# Patient Record
Sex: FEMALE | Race: WHITE | Employment: FULL TIME | URBAN - METROPOLITAN AREA
[De-identification: names, ages, dates, MRNs, and addresses within clinical notes are randomized per-mention and may not be internally consistent; named-entity substitution may affect disease eponyms.]

---

## 2017-01-13 DIAGNOSIS — F41.9 ANXIETY: ICD-10-CM

## 2017-01-13 RX ORDER — ALPRAZOLAM 1 MG/1
1 TABLET ORAL 3 TIMES DAILY PRN
Qty: 90 TABLET | Refills: 2 | OUTPATIENT
Start: 2017-01-13 | End: 2017-04-12 | Stop reason: SDUPTHER

## 2017-03-24 RX ORDER — BUPROPION HYDROCHLORIDE 300 MG/1
300 TABLET ORAL EVERY MORNING
Qty: 30 TABLET | Refills: 5 | Status: SHIPPED | OUTPATIENT
Start: 2017-03-24 | End: 2018-07-26 | Stop reason: ALTCHOICE

## 2017-04-12 DIAGNOSIS — F41.9 ANXIETY: ICD-10-CM

## 2017-04-12 RX ORDER — ALPRAZOLAM 1 MG/1
1 TABLET ORAL 3 TIMES DAILY PRN
Qty: 90 TABLET | Refills: 2 | OUTPATIENT
Start: 2017-04-12 | End: 2018-07-26 | Stop reason: ALTCHOICE

## 2017-04-12 RX ORDER — LORATADINE 10 MG/1
10 TABLET ORAL DAILY
Qty: 30 TABLET | Refills: 5 | Status: SHIPPED | OUTPATIENT
Start: 2017-04-12 | End: 2017-05-12

## 2017-04-12 RX ORDER — LORATADINE 10 MG/1
10 TABLET ORAL DAILY
Refills: 0 | COMMUNITY
Start: 2017-04-04 | End: 2017-04-12 | Stop reason: SDUPTHER

## 2017-04-20 ENCOUNTER — OFFICE VISIT (OUTPATIENT)
Dept: FAMILY MEDICINE CLINIC | Age: 32
End: 2017-04-20
Payer: MEDICARE

## 2017-04-20 VITALS
SYSTOLIC BLOOD PRESSURE: 115 MMHG | DIASTOLIC BLOOD PRESSURE: 80 MMHG | TEMPERATURE: 98.1 F | HEART RATE: 92 BPM | BODY MASS INDEX: 23.39 KG/M2 | WEIGHT: 132 LBS | OXYGEN SATURATION: 98 % | HEIGHT: 63 IN

## 2017-04-20 DIAGNOSIS — F43.10 PTSD (POST-TRAUMATIC STRESS DISORDER): ICD-10-CM

## 2017-04-20 DIAGNOSIS — G89.29 CHRONIC NONINTRACTABLE HEADACHE, UNSPECIFIED HEADACHE TYPE: Primary | ICD-10-CM

## 2017-04-20 DIAGNOSIS — Z79.899 ENCOUNTER FOR LONG TERM BENZODIAZEPINE THERAPY: ICD-10-CM

## 2017-04-20 DIAGNOSIS — R51.9 CHRONIC NONINTRACTABLE HEADACHE, UNSPECIFIED HEADACHE TYPE: Primary | ICD-10-CM

## 2017-04-20 LAB
AMPHETAMINE SCREEN, URINE: NEGATIVE
BARBITURATE SCREEN, URINE: NEGATIVE
BENZODIAZEPINE SCREEN, URINE: POSITIVE
COCAINE METABOLITE SCREEN URINE: NEGATIVE
MDMA URINE: NEGATIVE
METHADONE SCREEN, URINE: NEGATIVE
METHAMPHETAMINE, URINE: NEGATIVE
OPIATE SCREEN URINE: NEGATIVE
OXYCODONE SCREEN URINE: NEGATIVE
PHENCYCLIDINE SCREEN URINE: NEGATIVE
PROPOXYPHENE SCREEN, URINE: NORMAL
THC: NEGATIVE
TRICYCLIC ANTIDEPRESSANTS, UR: NEGATIVE

## 2017-04-20 PROCEDURE — 99214 OFFICE O/P EST MOD 30 MIN: CPT | Performed by: NURSE PRACTITIONER

## 2017-04-20 PROCEDURE — 80305 DRUG TEST PRSMV DIR OPT OBS: CPT | Performed by: NURSE PRACTITIONER

## 2017-04-20 RX ORDER — TOPIRAMATE 25 MG/1
25 TABLET ORAL NIGHTLY
Qty: 60 TABLET | Refills: 3 | Status: SHIPPED | OUTPATIENT
Start: 2017-04-20 | End: 2018-07-26 | Stop reason: ALTCHOICE

## 2017-04-20 ASSESSMENT — ENCOUNTER SYMPTOMS
VOMITING: 0
COUGH: 0
NAUSEA: 0
SHORTNESS OF BREATH: 0
CHEST TIGHTNESS: 0
ABDOMINAL PAIN: 0

## 2017-04-26 LAB
AMPHETAMINE SCREEN, URINE: NORMAL
BARBITURATE SCREEN, URINE: NORMAL
BENZODIAZEPINE SCREEN, URINE: NORMAL
CANNABINOID SCREEN URINE: NORMAL
COCAINE METABOLITE, URINE: NORMAL
MDMA URINE: NORMAL
METHADONE SCREEN, URINE: NORMAL
METHAMPHETAMINE, URINE: NORMAL
OPIATES, URINE: NORMAL
OXYCODONE: NORMAL
PCP: NORMAL
PHENCYCLIDINE, URINE: NORMAL
PROPOXYPHENE, URINE: NORMAL
TRICYCLIC ANTIDEPRESSANTS, UR: NORMAL

## 2017-04-27 DIAGNOSIS — Z79.899 ENCOUNTER FOR LONG TERM BENZODIAZEPINE THERAPY: ICD-10-CM

## 2017-04-28 ENCOUNTER — TELEPHONE (OUTPATIENT)
Dept: FAMILY MEDICINE CLINIC | Age: 32
End: 2017-04-28

## 2017-05-17 ENCOUNTER — HOSPITAL ENCOUNTER (EMERGENCY)
Age: 32
Discharge: HOME | End: 2017-05-17
Attending: EMERGENCY MEDICINE
Payer: MEDICARE

## 2017-05-17 VITALS
SYSTOLIC BLOOD PRESSURE: 118 MMHG | DIASTOLIC BLOOD PRESSURE: 82 MMHG | BODY MASS INDEX: 21.16 KG/M2 | HEART RATE: 81 BPM | WEIGHT: 115 LBS | HEIGHT: 62 IN | RESPIRATION RATE: 18 BRPM | OXYGEN SATURATION: 100 % | TEMPERATURE: 97.7 F

## 2017-05-17 DIAGNOSIS — S39.012A LUMBAR STRAIN, INITIAL ENCOUNTER: Primary | ICD-10-CM

## 2017-05-17 PROCEDURE — 6360000002 HC RX W HCPCS: Performed by: PHYSICIAN ASSISTANT

## 2017-05-17 PROCEDURE — 99283 EMERGENCY DEPT VISIT LOW MDM: CPT

## 2017-05-17 PROCEDURE — 96372 THER/PROPH/DIAG INJ SC/IM: CPT

## 2017-05-17 RX ORDER — CYCLOBENZAPRINE HCL 10 MG
5 TABLET ORAL 2 TIMES DAILY PRN
Qty: 10 TABLET | Refills: 0 | Status: SHIPPED | OUTPATIENT
Start: 2017-05-17 | End: 2017-05-27

## 2017-05-17 RX ORDER — LORATADINE 10 MG/1
10 CAPSULE, LIQUID FILLED ORAL DAILY
COMMUNITY
End: 2018-07-26 | Stop reason: ALTCHOICE

## 2017-05-17 RX ORDER — PREDNISONE 20 MG/1
20 TABLET ORAL DAILY
Qty: 5 TABLET | Refills: 0 | Status: SHIPPED | OUTPATIENT
Start: 2017-05-17 | End: 2017-05-22

## 2017-05-17 RX ORDER — KETOROLAC TROMETHAMINE 30 MG/ML
60 INJECTION, SOLUTION INTRAMUSCULAR; INTRAVENOUS ONCE
Status: COMPLETED | OUTPATIENT
Start: 2017-05-17 | End: 2017-05-17

## 2017-05-17 RX ADMIN — KETOROLAC TROMETHAMINE 60 MG: 30 INJECTION, SOLUTION INTRAMUSCULAR at 10:07

## 2017-05-17 ASSESSMENT — PAIN DESCRIPTION - ORIENTATION: ORIENTATION: LEFT;LOWER

## 2017-05-17 ASSESSMENT — ENCOUNTER SYMPTOMS
BOWEL INCONTINENCE: 0
BACK PAIN: 1
STRIDOR: 0
ABDOMINAL PAIN: 0
COUGH: 0
NAUSEA: 0
WHEEZING: 0
ABDOMINAL SWELLING: 0

## 2017-05-17 ASSESSMENT — PAIN DESCRIPTION - DESCRIPTORS: DESCRIPTORS: ACHING

## 2017-05-17 ASSESSMENT — PAIN SCALES - GENERAL
PAINLEVEL_OUTOF10: 8
PAINLEVEL_OUTOF10: 8

## 2017-05-17 ASSESSMENT — PAIN DESCRIPTION - LOCATION: LOCATION: BACK

## 2017-05-17 ASSESSMENT — PAIN DESCRIPTION - PAIN TYPE: TYPE: ACUTE PAIN

## 2017-05-17 ASSESSMENT — PAIN DESCRIPTION - FREQUENCY: FREQUENCY: CONTINUOUS

## 2017-12-26 DIAGNOSIS — A60.9 HSV (HERPES SIMPLEX VIRUS) ANOGENITAL INFECTION: Primary | ICD-10-CM

## 2017-12-26 RX ORDER — VALACYCLOVIR HYDROCHLORIDE 500 MG/1
500 TABLET, FILM COATED ORAL DAILY
Qty: 30 TABLET | Refills: 10 | Status: SHIPPED | OUTPATIENT
Start: 2017-12-26 | End: 2018-07-26 | Stop reason: SDUPTHER

## 2017-12-26 RX ORDER — VALACYCLOVIR HYDROCHLORIDE 500 MG/1
500 TABLET, FILM COATED ORAL 2 TIMES DAILY
Qty: 20 TABLET | Refills: 0 | Status: SHIPPED | OUTPATIENT
Start: 2017-12-26 | End: 2018-01-05

## 2017-12-26 NOTE — TELEPHONE ENCOUNTER
Patient had stopped her maintenance dose of Valtrex some months ago so had a breakout over [place]. Needs script for breakout and then new script for maintenance dose, which she will start using again. Scripts pended above.

## 2018-07-26 ENCOUNTER — OFFICE VISIT (OUTPATIENT)
Dept: FAMILY MEDICINE CLINIC | Age: 33
End: 2018-07-26
Payer: MEDICARE

## 2018-07-26 VITALS
WEIGHT: 128 LBS | OXYGEN SATURATION: 99 % | HEART RATE: 75 BPM | DIASTOLIC BLOOD PRESSURE: 70 MMHG | BODY MASS INDEX: 22.68 KG/M2 | TEMPERATURE: 97.7 F | RESPIRATION RATE: 16 BRPM | HEIGHT: 63 IN | SYSTOLIC BLOOD PRESSURE: 107 MMHG

## 2018-07-26 DIAGNOSIS — Z13.31 POSITIVE DEPRESSION SCREENING: ICD-10-CM

## 2018-07-26 DIAGNOSIS — J01.90 ACUTE SINUSITIS, RECURRENCE NOT SPECIFIED, UNSPECIFIED LOCATION: Primary | ICD-10-CM

## 2018-07-26 DIAGNOSIS — F32.A DEPRESSIVE DISORDER: ICD-10-CM

## 2018-07-26 DIAGNOSIS — F41.9 ANXIETY: ICD-10-CM

## 2018-07-26 PROCEDURE — 99213 OFFICE O/P EST LOW 20 MIN: CPT | Performed by: FAMILY MEDICINE

## 2018-07-26 PROCEDURE — G8427 DOCREV CUR MEDS BY ELIG CLIN: HCPCS | Performed by: FAMILY MEDICINE

## 2018-07-26 PROCEDURE — 96160 PT-FOCUSED HLTH RISK ASSMT: CPT | Performed by: FAMILY MEDICINE

## 2018-07-26 PROCEDURE — G8431 POS CLIN DEPRES SCRN F/U DOC: HCPCS | Performed by: FAMILY MEDICINE

## 2018-07-26 PROCEDURE — 1036F TOBACCO NON-USER: CPT | Performed by: FAMILY MEDICINE

## 2018-07-26 PROCEDURE — G8420 CALC BMI NORM PARAMETERS: HCPCS | Performed by: FAMILY MEDICINE

## 2018-07-26 RX ORDER — ONDANSETRON 4 MG/1
TABLET, ORALLY DISINTEGRATING ORAL
Refills: 0 | COMMUNITY
Start: 2018-07-17 | End: 2020-10-05

## 2018-07-26 RX ORDER — BUPROPION HYDROCHLORIDE 150 MG/1
150 TABLET ORAL EVERY MORNING
Qty: 10 TABLET | Refills: 0 | Status: SHIPPED | OUTPATIENT
Start: 2018-07-26 | End: 2020-10-05

## 2018-07-26 RX ORDER — BUPROPION HYDROCHLORIDE 150 MG/1
150 TABLET ORAL EVERY MORNING
Qty: 10 TABLET | Refills: 0 | Status: SHIPPED | OUTPATIENT
Start: 2018-07-26 | End: 2018-07-26 | Stop reason: ALTCHOICE

## 2018-07-26 RX ORDER — AMOXICILLIN 500 MG/1
500 TABLET, FILM COATED ORAL 3 TIMES DAILY
Qty: 30 TABLET | Refills: 0 | Status: SHIPPED | OUTPATIENT
Start: 2018-07-26 | End: 2018-08-05

## 2018-07-26 RX ORDER — VALACYCLOVIR HYDROCHLORIDE 500 MG/1
TABLET, FILM COATED ORAL
COMMUNITY
Start: 2016-04-19 | End: 2020-10-05

## 2018-07-26 ASSESSMENT — PATIENT HEALTH QUESTIONNAIRE - PHQ9
4. FEELING TIRED OR HAVING LITTLE ENERGY: 1
9. THOUGHTS THAT YOU WOULD BE BETTER OFF DEAD, OR OF HURTING YOURSELF: 1
3. TROUBLE FALLING OR STAYING ASLEEP: 3
10. IF YOU CHECKED OFF ANY PROBLEMS, HOW DIFFICULT HAVE THESE PROBLEMS MADE IT FOR YOU TO DO YOUR WORK, TAKE CARE OF THINGS AT HOME, OR GET ALONG WITH OTHER PEOPLE: 3
2. FEELING DOWN, DEPRESSED OR HOPELESS: 2
8. MOVING OR SPEAKING SO SLOWLY THAT OTHER PEOPLE COULD HAVE NOTICED. OR THE OPPOSITE, BEING SO FIGETY OR RESTLESS THAT YOU HAVE BEEN MOVING AROUND A LOT MORE THAN USUAL: 3
1. LITTLE INTEREST OR PLEASURE IN DOING THINGS: 3
7. TROUBLE CONCENTRATING ON THINGS, SUCH AS READING THE NEWSPAPER OR WATCHING TELEVISION: 3
5. POOR APPETITE OR OVEREATING: 3
6. FEELING BAD ABOUT YOURSELF - OR THAT YOU ARE A FAILURE OR HAVE LET YOURSELF OR YOUR FAMILY DOWN: 3
SUM OF ALL RESPONSES TO PHQ9 QUESTIONS 1 & 2: 5
SUM OF ALL RESPONSES TO PHQ QUESTIONS 1-9: 22

## 2018-07-26 ASSESSMENT — ENCOUNTER SYMPTOMS
HOARSE VOICE: 1
RHINORRHEA: 1
SHORTNESS OF BREATH: 0
NAUSEA: 1
SINUS PAIN: 1
DIARRHEA: 0
SINUS PRESSURE: 1
COUGH: 1
SORE THROAT: 1
VOMITING: 0
ABDOMINAL PAIN: 0
EYES NEGATIVE: 1
WHEEZING: 0
BACK PAIN: 0
SWOLLEN GLANDS: 1
CHEST TIGHTNESS: 0

## 2018-07-26 NOTE — PROGRESS NOTES
Associated symptoms include chills, congestion, coughing, diaphoresis, headaches, a hoarse voice, neck pain, sinus pressure, sneezing, a sore throat and swollen glands. Pertinent negatives include no ear pain or shortness of breath. Treatments tried: corricidin, benadryl, nasal spray, claritin. The treatment provided no relief.        Past Medical History:   Diagnosis Date    Anxiety     Asthma     HSV (herpes simplex virus) anogenital infection 12/26/2017    Mitral valve prolapse 2005    with regurg, has echo q 6 months, premed with dental visits    POTS (postural orthostatic tachycardia syndrome) 2012    Pulmonary hypertension       Past Surgical History:   Procedure Laterality Date    BREAST BIOPSY      BREAST ENHANCEMENT SURGERY Bilateral 2008    removed in 2011   [facility] Bilateral 2011    COLPOSCOPY  06/20/2016    Dr. Nadia Mejia Bilateral 2011    TUBAL LIGATION      2015 at flower     WISDOM TOOTH EXTRACTION         Family History   Problem Relation Age of Onset    Sudden Death Paternal Grandfather         suicide    Diabetes Maternal Grandmother     Heart Attack Maternal Grandmother     Hypertension Maternal Grandfather     Colon Cancer Maternal Grandfather     Diabetes Mother     Hypertension Mother     Osteoporosis Paternal Grandmother        Social History   Substance Use Topics    Smoking status: Never Smoker    Smokeless tobacco: Never Used    Alcohol use No      Current Outpatient Prescriptions   Medication Sig Dispense Refill    ondansetron (ZOFRAN-ODT) 4 MG disintegrating tablet dissolve 1 tablet ON TONGUE every 8 hours if needed for nausea  0    valACYclovir (VALTREX) 500 MG tablet Take by mouth      Amoxicillin 500 MG TABS Take 500 mg by mouth 3 times daily for 10 days 30 tablet 0    buPROPion (WELLBUTRIN XL) 150 MG extended release tablet Take 1 tablet by mouth every morning for 10 days 10 tablet 0   

## 2018-07-26 NOTE — PATIENT INSTRUCTIONS
techniques. · Engage your mind. Get out and do something you enjoy. Go to a funny movie, or take a walk or hike. Plan your day. Having too much or too little to do can make you anxious. · Keep a record of your symptoms. Discuss your fears with a good friend or family member, or join a support group for people with similar problems. Talking to others sometimes relieves stress. · Get involved in social groups, or volunteer to help others. Being alone sometimes makes things seem worse than they are. · Get at least 30 minutes of exercise on most days of the week to relieve stress. Walking is a good choice. You also may want to do other activities, such as running, swimming, cycling, or playing tennis or team sports. Relaxation techniques  Do relaxation exercises 10 to 20 minutes a day. You can play soothing, relaxing music while you do them, if you wish. · Tell others in your house that you are going to do your relaxation exercises. Ask them not to disturb you. · Find a comfortable place, away from all distractions and noise. · Lie down on your back, or sit with your back straight. · Focus on your breathing. Make it slow and steady. · Breathe in through your nose. Breathe out through either your nose or mouth. · Breathe deeply, filling up the area between your navel and your rib cage. Breathe so that your belly goes up and down. · Do not hold your breath. · Breathe like this for 5 to 10 minutes. Notice the feeling of calmness throughout your whole body. As you continue to breathe slowly and deeply, relax by doing the following for another 5 to 10 minutes:  · Tighten and relax each muscle group in your body. You can begin at your toes and work your way up to your head. · Imagine your muscle groups relaxing and becoming heavy. · Empty your mind of all thoughts. · Let yourself relax more and more deeply. · Become aware of the state of calmness that surrounds you.   · When your relaxation time is over, you happen. This feeling interferes with your life. These disorders include:  · Generalized anxiety disorder. You feel worried and stressed about many everyday events and activities. This goes on for several months and disrupts your life on most days. · Panic disorder. You have repeated panic attacks. A panic attack is a sudden, intense fear or anxiety. It may make you feel short of breath. Your heart may pound. · Social anxiety disorder. You feel very anxious about what you will say or do in front of people. For example, you may be scared to talk or eat in public. This problem affects your daily life. · Phobias. You are very scared of a specific object, situation, or activity. For example, you may fear spiders, high places, or small spaces. What are the symptoms? Generalized anxiety disorder  Symptoms may include:  · Feeling worried and stressed about many things almost every day. · Feeling tired or irritable. You may have a hard time concentrating. · Having headaches or muscle aches. · Having a hard time getting to sleep or staying asleep. Panic disorder  You may have repeated panic attacks when there is no reason for feeling afraid. You may change your daily activities because you worry that you will have another attack. Symptoms may include:  · Intense fear, terror, or anxiety. · Trouble breathing or very fast breathing. · Chest pain or tightness. · A heartbeat that races or is not regular. Social anxiety disorder  Symptoms may include:  · Fear about a social situation, such as eating in front of others or speaking in public. You may worry a lot. Or you may be afraid that something bad will happen. · Anxiety that can cause you to blush, sweat, and feel shaky. · A heartbeat that is faster than normal.  · A hard time focusing. Phobias  Symptoms may include:  · More fear than most people of being around an object, being in a situation, or doing an activity.  You might also be stressed about the chance

## 2019-10-14 ENCOUNTER — HOSPITAL ENCOUNTER (EMERGENCY)
Age: 34
Discharge: HOME | End: 2019-10-14
Attending: EMERGENCY MEDICINE
Payer: MEDICARE

## 2019-10-14 VITALS
RESPIRATION RATE: 16 BRPM | OXYGEN SATURATION: 100 % | TEMPERATURE: 98.1 F | DIASTOLIC BLOOD PRESSURE: 88 MMHG | SYSTOLIC BLOOD PRESSURE: 141 MMHG | BODY MASS INDEX: 23.4 KG/M2 | WEIGHT: 130 LBS | HEART RATE: 102 BPM

## 2019-10-14 DIAGNOSIS — F41.1 ANXIETY STATE: Primary | ICD-10-CM

## 2019-10-14 PROCEDURE — 6370000000 HC RX 637 (ALT 250 FOR IP): Performed by: NURSE PRACTITIONER

## 2019-10-14 PROCEDURE — 99283 EMERGENCY DEPT VISIT LOW MDM: CPT

## 2019-10-14 RX ORDER — LORAZEPAM 1 MG/1
0.5 TABLET ORAL ONCE
Status: COMPLETED | OUTPATIENT
Start: 2019-10-14 | End: 2019-10-14

## 2019-10-14 RX ORDER — FLUOXETINE HYDROCHLORIDE 20 MG/1
20 CAPSULE ORAL DAILY
COMMUNITY
End: 2022-02-01 | Stop reason: SDUPTHER

## 2019-10-14 RX ORDER — LORAZEPAM 0.5 MG/1
0.5 TABLET ORAL EVERY 8 HOURS PRN
Qty: 6 TABLET | Refills: 0 | Status: SHIPPED | OUTPATIENT
Start: 2019-10-14 | End: 2019-10-16

## 2019-10-14 RX ORDER — OMEPRAZOLE 20 MG/1
40 CAPSULE, DELAYED RELEASE ORAL DAILY
COMMUNITY
End: 2020-10-05

## 2019-10-14 RX ADMIN — LORAZEPAM 0.5 MG: 1 TABLET ORAL at 13:04

## 2019-10-14 ASSESSMENT — ENCOUNTER SYMPTOMS
COUGH: 0
VOMITING: 0
TROUBLE SWALLOWING: 0
SHORTNESS OF BREATH: 0
NAUSEA: 0

## 2020-10-05 ENCOUNTER — OFFICE VISIT (OUTPATIENT)
Dept: OBGYN CLINIC | Age: 35
End: 2020-10-05
Payer: MEDICARE

## 2020-10-05 ENCOUNTER — HOSPITAL ENCOUNTER (OUTPATIENT)
Age: 35
Setting detail: SPECIMEN
Discharge: HOME | End: 2020-10-05
Payer: MEDICARE

## 2020-10-05 VITALS
SYSTOLIC BLOOD PRESSURE: 118 MMHG | RESPIRATION RATE: 16 BRPM | BODY MASS INDEX: 28.46 KG/M2 | WEIGHT: 158.13 LBS | TEMPERATURE: 98.2 F | DIASTOLIC BLOOD PRESSURE: 72 MMHG

## 2020-10-05 PROBLEM — I36.1 NONRHEUMATIC TRICUSPID VALVE REGURGITATION: Status: ACTIVE | Noted: 2018-08-15

## 2020-10-05 PROBLEM — I34.0 NONRHEUMATIC MITRAL (VALVE) INSUFFICIENCY: Status: ACTIVE | Noted: 2018-08-15

## 2020-10-05 PROBLEM — I27.20 PULMONARY HYPERTENSION (HCC): Status: ACTIVE | Noted: 2017-07-14

## 2020-10-05 PROBLEM — R00.2 PALPITATIONS: Status: ACTIVE | Noted: 2018-08-15

## 2020-10-05 PROBLEM — F33.1 MODERATE EPISODE OF RECURRENT MAJOR DEPRESSIVE DISORDER (HCC): Status: ACTIVE | Noted: 2017-07-14

## 2020-10-05 PROBLEM — A60.04 HERPES SIMPLEX VULVOVAGINITIS: Status: ACTIVE | Noted: 2017-07-14

## 2020-10-05 PROCEDURE — G8484 FLU IMMUNIZE NO ADMIN: HCPCS | Performed by: NURSE PRACTITIONER

## 2020-10-05 PROCEDURE — 99385 PREV VISIT NEW AGE 18-39: CPT | Performed by: NURSE PRACTITIONER

## 2020-10-05 RX ORDER — VALACYCLOVIR HYDROCHLORIDE 500 MG/1
500 TABLET, FILM COATED ORAL DAILY
Qty: 30 TABLET | Refills: 0 | Status: SHIPPED | OUTPATIENT
Start: 2020-10-05 | End: 2021-01-26 | Stop reason: SDUPTHER

## 2020-10-05 ASSESSMENT — ENCOUNTER SYMPTOMS
VOMITING: 0
ABDOMINAL PAIN: 0
COLOR CHANGE: 0
CONSTIPATION: 0
NAUSEA: 0
DIARRHEA: 0
RHINORRHEA: 0
BACK PAIN: 0
SHORTNESS OF BREATH: 0
COUGH: 0

## 2020-10-05 NOTE — PATIENT INSTRUCTIONS
your doctor if:  · You have vaginal bleeding or pain for more than 2 days after the test. It is normal to have a small amount of bleeding for a day or two after the test.  Where can you learn more? Go to [email or web address].healthMy [email or web address] org and sign in to your Violet account. Enter Y500 in the TELOS box to learn more about \"Pap Test: Care Instructions. \"     If you do not have an account, please click on the \"Sign Up Now\" link. Current as of: August 22, 2019               Content Version: 12.5  © 8331-4562 [employer]wise, Incorporated. Care instructions adapted under license by [facility] ([facility]). If you have questions about a medical condition or this instruction, always ask your healthcare professional. [address] any warranty or liability for your use of this information.

## 2020-10-05 NOTE — PROGRESS NOTES
Renny Abbott is a 28 y.o.  here for her annual exam.  The patient was seen and examined. The patients past medical, surgical, social and family history were reviewed. Current medications and allergies were reviewed, and documented in the chart. She is . She has 5 children. She is not currently employed outside the home    Exercise Yes  Diet Yes  Tobacco abuse No    Last PAP:  + ASCUS with + HPV HR other had colp neg dysplasia, hx of abnormal PAP no  Family hx uterine or ovarian cancer-denies   Family hx of breast cancer -denies  family hx colon cancer -MGF        Sexually active: yes - same partner for 4 years, multiple partners: No, Dyspareunia: No, Vaginal discharge: no,  UTI symptoms: no, voiding difficulties: yes - every since having children she has had 5 vaginal deliveries she has had issues with stress and urge incontinence and feels getting worse. , bowels regular:Yes bloating:no      Menstrual history:  Menarche age- 13, cycle every  28 days,  lasts 4 days. Birth control: TL   LMP: 2 weeks ago    Has hx of hsv previously on preventive valtrex daily and would liek to restart d/t frequent outbreaks, denies current outbreak or symptoms.      OB History    Para Term  AB Living   5 5 5     4   SAB TAB Ectopic Molar Multiple Live Births             4      # Outcome Date GA Lbr Shayne/2nd Weight Sex Delivery Anes PTL Lv   5 Term 14 39w1d 01:07 8 lb 14.9 oz (4.051 kg) M Vag-Spont      4 Term         MANN   3 Term         MANN   2 Term         MANN   1 Term         MANN       Vitals:    10/05/20 1426   BP: 118/72   Site: Left Upper Arm   Position: Sitting   Cuff Size: Large Adult   Resp: 16   Temp: 98.2 °F (36.8 °C)   TempSrc: Temporal   Weight: 158 lb 2 oz (71.7 kg)       Wt Readings from Last 3 Encounters:   10/05/20 158 lb 2 oz (71.7 kg)   10/14/19 130 lb (59 kg)   18 128 lb (58.1 kg)     Past Medical History:   Diagnosis Date    Anxiety     Asthma     HSV (herpes simplex virus) anogenital infection 12/26/2017    Mitral valve prolapse 2005    with regurg, has echo q 6 months, premed with dental visits    POTS (postural orthostatic tachycardia syndrome) 2012    Pulmonary hypertension ([address]                                                                    Past Surgical History:   Procedure Laterality Date    BREAST BIOPSY      BREAST ENHANCEMENT SURGERY Bilateral 2008    removed in 2011   Matthew Don Bilateral 2011    COLPOSCOPY  06/20/2016    Dr. Tharon Kanner Bilateral 2011    TUBAL LIGATION      2015 at flower     WISDOM TOOTH EXTRACTION       Family History   Problem Relation Age of Onset    Sudden Death Paternal Grandfather         suicide    Diabetes Maternal Grandmother     Heart Attack Maternal Grandmother     Hypertension Maternal Grandfather     Colon Cancer Maternal Grandfather     Diabetes Mother     Hypertension Mother     Osteoporosis Paternal Grandmother      Social History     Tobacco Use   Smoking Status Never Smoker   Smokeless Tobacco Never Used     Social History     Substance and Sexual Activity   Alcohol Use No        Social History     Tobacco History     Smoking Status  Never Smoker    Smokeless Tobacco Use  Never Used          Alcohol History     Alcohol Use Status  No          Drug Use     Drug Use Status  Yes Types  Marijuana          Sexual Activity     Sexually Active  Yes Partners  Male              Allergies   Allergen Reactions    Latex     Codeine      Current Outpatient Medications   Medication Sig Dispense Refill    valACYclovir (VALTREX) 500 MG tablet Take 1 tablet by mouth daily 30 tablet 0    FLUoxetine (PROZAC) 20 MG capsule Take 20 mg by mouth daily       No current facility-administered medications for this visit. Subjective:     Review of Systems   Constitutional: Negative for chills, fatigue, fever and unexpected weight change.    HENT: Negative for does BSE  Vulva-no lesions  Vagina-pink rugated  Cervix-firm, 2 cm. Nontender, freely movable, no lesions  Uterus-ant. Smooth, firm, nontender, freely movable  Adnexa-no masses or tenderness   Musculoskeletal: Normal range of motion. Lymphadenopathy:      Cervical: No cervical adenopathy. Skin:     General: Skin is warm and dry. Findings: No rash. Neurological:      Mental Status: She is alert and oriented to person, place, and time. Cranial Nerves: No cranial nerve deficit. Deep Tendon Reflexes: Reflexes are normal and symmetric. Psychiatric:         Behavior: Behavior normal.         Thought Content: Thought content normal.         Judgment: Judgment normal.       /72 (Site: Left Upper Arm, Position: Sitting, Cuff Size: Large Adult)   Temp 98.2 °F (36.8 °C) (Temporal)   Resp 16   Wt 158 lb 2 oz (71.7 kg)   BMI 28.46 kg/m²     Assessment:       Diagnosis Orders   1. Visit for gynecologic examination  PAP SMEAR   2. Mixed stress and urge incontinence  [facility] - [place]       Breast exam completed. Pelvic exam pap smear collected and sent. Cultures sent No    Plan:   Collect pap   BSE reviewed  STD prevention reviewed  Refer pft for stress/urge incontinence. Restart valtrex denies current outbreak  Cultures declined   BC  TL  DVT/ACHEs signs reviewed with patient. Refill medication if appropriate  Diet & Exercise reviewed with pt. Preventive  Health through PCP   RV prn/annual           Orders Placed This Encounter   Procedures    PAP SMEAR     Patient History:    No LMP recorded.   OBGYN Status: Having periods  Past Surgical History:  No date: BREAST BIOPSY  2008: BREAST ENHANCEMENT SURGERY; Bilateral      Comment:  removed in 2011 2011: [address]; Bilateral  06/20/2016: COLPOSCOPY      Comment:  Dr. Orion Dennis [facility]  2011: [address]; Bilateral  No date: TUBAL LIGATION      Comment:  2015 at flower   No date: WISDOM TOOTH EXTRACTION      Social History    Tobacco Use      Smoking status: Never Smoker      Smokeless tobacco: Never Used       Standing Status:   Future     Standing Expiration Date:   10/6/2021     Order Specific Question:   Collection Type     Answer: Thin Prep     Order Specific Question:   Prior Abnormal Pap Test     Answer:   No     Order Specific Question:   Screening or Diagnostic     Answer:   Screening     Order Specific Question:   HPV Requested? Answer:   Yes     Order Specific Question:   High Risk Patient     Answer:   N/A   [facility] [place]     Referral Priority:   Routine     Referral Type:   Eval and Treat     Referral Reason:   Specialty Services Required     Requested Specialty:   Physical Therapy     Number of Visits Requested:   1     Orders Placed This Encounter   Medications    valACYclovir (VALTREX) 500 MG tablet     Sig: Take 1 tablet by mouth daily     Dispense:  30 tablet     Refill:  0       Patient given educational materials - seepatient instructions. Discussed use, benefit, and side effects of prescribed medications. All patient questions answered. Pt voiced understanding. Reviewed health maintenance. Instructed to continue current medications, diet and exercise. Patient agreedwith treatment plan. Follow up as directed.       Electronically signed by BALDO Pratt CNP on 10/5/2020at 3:05 PM

## 2020-10-09 LAB
HPV SOURCE: NORMAL
HPV, GENOTYPE 16: NOT DETECTED
HPV, GENOTYPE 18: NOT DETECTED
HPV, HIGH RISK OTHER: NOT DETECTED

## 2020-10-13 LAB — CYTOLOGY REPORT: NORMAL

## 2020-10-14 RX ORDER — FLUCONAZOLE 150 MG/1
150 TABLET ORAL ONCE
Qty: 1 TABLET | Refills: 0 | Status: SHIPPED | OUTPATIENT
Start: 2020-10-14 | End: 2020-10-14

## 2021-01-28 RX ORDER — VALACYCLOVIR HYDROCHLORIDE 500 MG/1
500 TABLET, FILM COATED ORAL DAILY
Qty: 30 TABLET | Refills: 0 | Status: SHIPPED | OUTPATIENT
Start: 2021-01-28 | End: 2021-06-15 | Stop reason: SDUPTHER

## 2021-05-04 ENCOUNTER — HOSPITAL ENCOUNTER (EMERGENCY)
Age: 36
Discharge: HOME | End: 2021-05-04
Attending: EMERGENCY MEDICINE
Payer: MEDICARE

## 2021-05-04 VITALS
HEIGHT: 63 IN | DIASTOLIC BLOOD PRESSURE: 102 MMHG | BODY MASS INDEX: 28.35 KG/M2 | OXYGEN SATURATION: 99 % | SYSTOLIC BLOOD PRESSURE: 152 MMHG | TEMPERATURE: 98.9 F | RESPIRATION RATE: 16 BRPM | HEART RATE: 109 BPM | WEIGHT: 160 LBS

## 2021-05-04 DIAGNOSIS — F41.9 ANXIETY: Primary | ICD-10-CM

## 2021-05-04 DIAGNOSIS — R51.9 NONINTRACTABLE HEADACHE, UNSPECIFIED CHRONICITY PATTERN, UNSPECIFIED HEADACHE TYPE: ICD-10-CM

## 2021-05-04 PROCEDURE — 99283 EMERGENCY DEPT VISIT LOW MDM: CPT

## 2021-05-04 PROCEDURE — 6370000000 HC RX 637 (ALT 250 FOR IP): Performed by: EMERGENCY MEDICINE

## 2021-05-04 RX ORDER — LORAZEPAM 1 MG/1
1 TABLET ORAL ONCE
Status: COMPLETED | OUTPATIENT
Start: 2021-05-04 | End: 2021-05-04

## 2021-05-04 RX ORDER — ACETAMINOPHEN 500 MG
1000 TABLET ORAL ONCE
Status: COMPLETED | OUTPATIENT
Start: 2021-05-04 | End: 2021-05-04

## 2021-05-04 RX ORDER — GABAPENTIN 600 MG/1
600 TABLET ORAL NIGHTLY
COMMUNITY

## 2021-05-04 RX ADMIN — LORAZEPAM 1 MG: 1 TABLET ORAL at 21:45

## 2021-05-04 RX ADMIN — ACETAMINOPHEN 1000 MG: 500 TABLET, FILM COATED ORAL at 21:45

## 2021-05-04 ASSESSMENT — ENCOUNTER SYMPTOMS
DIARRHEA: 0
COUGH: 0
VOMITING: 0
ABDOMINAL PAIN: 0
SHORTNESS OF BREATH: 0
BACK PAIN: 0

## 2021-05-04 ASSESSMENT — PAIN DESCRIPTION - LOCATION: LOCATION: HEAD

## 2021-05-04 ASSESSMENT — PAIN SCALES - GENERAL: PAINLEVEL_OUTOF10: 7

## 2021-05-04 NOTE — LETTER
[facility] ED  [address] [address]  Phone: [phone]             May 4, 2021    Patient: David Ray   YOB: 1985   Date of Visit: 5/4/2021       To Whom It May Concern:    Rashard Eagle was seen and treated in our emergency department on 5/4/2021. She may return to work on 05/10/2021.       Sincerely,             Signature:__________________________________

## 2021-05-05 ENCOUNTER — FOLLOWUP TELEPHONE ENCOUNTER (OUTPATIENT)
Dept: PSYCHIATRY | Age: 36
End: 2021-05-05

## 2021-05-05 NOTE — ED NOTES
Pt discharged in stable condition with prescriptions and dc instructions. Pt ambulates to door with steady gait and without assistance.         Norma Vaughn RN  05/04/21 0651
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
## 2021-06-15 ENCOUNTER — PATIENT MESSAGE (OUTPATIENT)
Dept: OBGYN CLINIC | Age: 36
End: 2021-06-15

## 2021-06-15 RX ORDER — VALACYCLOVIR HYDROCHLORIDE 1 G/1
1000 TABLET, FILM COATED ORAL DAILY
Qty: 30 TABLET | Refills: 3 | Status: SHIPPED | OUTPATIENT
Start: 2021-06-15 | End: 2021-10-04 | Stop reason: SDUPTHER

## 2021-06-15 NOTE — TELEPHONE ENCOUNTER
From: Dereck De La Torre  To: Karol Waggonerfallon, APRN - CNP  Sent: 6/15/2021 10:35 AM EDT  Subject: Prescription Question    Hello! I have been having back to back herpes outbreaks for the past 6 months. Janeth Aakash been taking the Valtrex but its not helping. I desperately need help Bc Im tired of being sick. [facility] [address] in [facility] .

## 2021-10-04 RX ORDER — VALACYCLOVIR HYDROCHLORIDE 1 G/1
1000 TABLET, FILM COATED ORAL DAILY
Qty: 30 TABLET | Refills: 0 | Status: SHIPPED | OUTPATIENT
Start: 2021-10-04 | End: 2022-08-09 | Stop reason: SDUPTHER

## 2021-10-04 NOTE — TELEPHONE ENCOUNTER
Please call pt to schedule annual exam she is overdue I refilled script for vatrex but she will need appt for further refills thank you.

## 2022-01-05 ENCOUNTER — OFFICE VISIT (OUTPATIENT)
Dept: OBGYN CLINIC | Age: 37
End: 2022-01-05
Payer: MEDICARE

## 2022-01-05 VITALS
DIASTOLIC BLOOD PRESSURE: 70 MMHG | SYSTOLIC BLOOD PRESSURE: 110 MMHG | WEIGHT: 155.3 LBS | BODY MASS INDEX: 27.52 KG/M2 | HEIGHT: 63 IN

## 2022-01-05 DIAGNOSIS — N83.201 RIGHT OVARIAN CYST: ICD-10-CM

## 2022-01-05 DIAGNOSIS — Z01.419 WOMEN'S ANNUAL ROUTINE GYNECOLOGICAL EXAMINATION: Primary | ICD-10-CM

## 2022-01-05 DIAGNOSIS — N39.46 MIXED STRESS AND URGE INCONTINENCE: ICD-10-CM

## 2022-01-05 DIAGNOSIS — N94.89 PELVIC CONGESTION SYNDROME: ICD-10-CM

## 2022-01-05 DIAGNOSIS — R10.2 PELVIC PAIN: ICD-10-CM

## 2022-01-05 PROCEDURE — 99395 PREV VISIT EST AGE 18-39: CPT | Performed by: NURSE PRACTITIONER

## 2022-01-05 PROCEDURE — G8484 FLU IMMUNIZE NO ADMIN: HCPCS | Performed by: NURSE PRACTITIONER

## 2022-01-05 RX ORDER — DROSPIRENONE 4 MG/1
TABLET, FILM COATED ORAL
Qty: 84 TABLET | Refills: 0 | Status: SHIPPED | OUTPATIENT
Start: 2022-01-05 | End: 2022-03-09 | Stop reason: SDUPTHER

## 2022-01-05 RX ORDER — BUTALBITAL, ACETAMINOPHEN AND CAFFEINE 300; 40; 50 MG/1; MG/1; MG/1
1 CAPSULE ORAL EVERY 4 HOURS PRN
COMMUNITY

## 2022-01-05 RX ORDER — ALPRAZOLAM 0.25 MG/1
0.25 TABLET ORAL 2 TIMES DAILY
COMMUNITY
End: 2022-06-30

## 2022-01-05 ASSESSMENT — ENCOUNTER SYMPTOMS
COUGH: 0
VOMITING: 0
COLOR CHANGE: 0
SHORTNESS OF BREATH: 0
DIARRHEA: 0
CONSTIPATION: 0
NAUSEA: 0

## 2022-01-05 NOTE — PATIENT INSTRUCTIONS
Patient Education        Pap Test: Care Instructions  Overview     The Pap test (also called a Pap smear) is a screening test for cancer of the cervix, which is the lower part of the uterus that opens into the vagina. The test can help your doctor find early changes in the cells that could lead to cancer. The sample of cells taken during your test has been sent to a lab so that an expert can look at the cells. It usually takes a week or two to get the results back. Follow-up care is a key part of your treatment and safety. Be sure to make and go to all appointments, and call your doctor if you are having problems. It's also a good idea to know your test results and keep a list of the medicines you take. What do the results mean? · A normal result means that the test did not find any abnormal cells in the sample. · An abnormal result can mean many things. Most of these are not cancer. The results of your test may be abnormal because:  ? You have an infection of the vagina or cervix, such as a yeast infection. ? You have an IUD (intrauterine device for birth control). ? You have low estrogen levels after menopause that are causing the cells to change. ? You have cell changes that may be a sign of precancer or cancer. The results are ranked based on how serious the changes might be. There are many other reasons why you might not get a normal result. If the results were abnormal, you may need to get another test within a few weeks or months. If the results show changes that could be a sign of cancer, you may need a test called a colposcopy, which provides a more complete view of the cervix. Sometimes the lab cannot use the sample because it does not contain enough cells or was not preserved well. If so, you may need to have the test again. This is not common, but it does happen from time to time. When should you call for help?   Watch closely for changes in your health, and be sure to contact your doctor if:    · You have vaginal bleeding or pain for more than 2 days after the test. It is normal to have a small amount of bleeding for a day or two after the test.   Where can you learn more? Go to https://[employer]. org and sign in to your [employer] account. Enter O358 in the ParentingInformer box to learn more about \"Pap Test: Care Instructions. \"     If you do not have an account, please click on the \"Sign Up Now\" link. Current as of: September 8, 2021               Content Version: 13.1  © 8337-8397 Healthwise, Incorporated. Care instructions adapted under license by [facility], [place] ([facility]). If you have questions about a medical condition or this instruction, always ask your healthcare professional. [address] any warranty or liability for your use of this information.

## 2022-01-05 NOTE — PROGRESS NOTES
Stefania Daley is a 39 y.o.  here for her annual exam.  The patient was seen and examined. The patients past medical, surgical, social and family history were reviewed. Current medications and allergies were reviewed, and documented in the chart. She is . She has 5 children. She is not currently employed outside the home     Exercise Yes  Diet Yes  Tobacco abuse No     Last PAP: 2020- negative. In  2016 + ASCUS with + HPV HR other had colp neg dysplasia,   Family hx uterine or ovarian cancer-denies   Family hx of breast cancer -denies  family hx colon cancer -MGF           Sexually active: yes - same partner for 5 years, multiple partners: No, Dyspareunia: No, Vaginal discharge: no,  UTI symptoms: no, voiding difficulties: yes - every since having children she has had 5 vaginal deliveries she has had issues with stress and urge incontinence and felt getting worse last year was referred to PFT but was never able to go she states she bought a device off of [employer] that was supposed to help her do kegels that she inserted vaginally and she states used for couple months but still having symptoms. , bowels regular:Yes bloating:no        Menstrual history:  Menarche age- 13, cycle every  28 days,  lasts 4 days. She has had issues with increased pelvic pain she states for 1.5 weeks prior to menses pain and can worsen with movement and then once starts period. She states has mood changes for that 1.5 weeks prior to menses also and is on prozac already. She is currently seeing a vascular doctor at [address] for labial varicosities and she has been  dx with  [facility], she had venography, manometry, and intravascular ultrasound, she has to schedule follow up with her doctor to discuss this and treatment plan.  was told had ovarian cyst on her MRI in 2021.         Birth control: TL   LMP: 21    OB History    Para Term  AB Living   5 5 5     4   SAB IAB Ectopic Molar Multiple Live Births             4      # Outcome Date GA Lbr Shayne/2nd Weight Sex Delivery Anes PTL Lv   5 Term 07/04/14 39w1d 01:07 8 lb 14.9 oz (4.051 kg) M Vag-Spont      4 Term         MANN   3 Term         MANN   2 Term         MANN   1 Term         MANN       Vitals:    01/05/22 1501   BP: 110/70   Site: Left Upper Arm   Position: Sitting   Cuff Size: Medium Adult   Weight: 155 lb 4.8 oz (70.4 kg)   Height: 5' 3\" (1.6 m)       Wt Readings from Last 3 Encounters:   01/05/22 155 lb 4.8 oz (70.4 kg)   05/04/21 160 lb (72.6 kg)   10/05/20 158 lb 2 oz (71.7 kg)     Past Medical History:   Diagnosis Date    Anxiety     Asthma     Bladder prolapse, female, acquired     HSV (herpes simplex virus) anogenital infection 12/26/2017    Mitral valve prolapse 2005    with regurg, has echo q 6 months, premed with dental visits    Ovary anomaly     reflux    Pelvic congestive syndrome     POTS (postural orthostatic tachycardia syndrome) 2012    Pulmonary hypertension ([address]                                                                    Past Surgical History:   Procedure Laterality Date    BREAST BIOPSY      BREAST ENHANCEMENT SURGERY Bilateral 2008    removed in 2011   [place] [facility] Bilateral 2011    COLPOSCOPY  06/20/2016    Dr. Danae Christie Bilateral 2011    [facility]     WISDOM TOOTH EXTRACTION       Family History   Problem Relation Age of Onset    Sudden Death Paternal Grandfather         suicide    Diabetes Maternal Grandmother     Heart Attack Maternal Grandmother     Hypertension Maternal Grandfather     Colon Cancer Maternal Grandfather     Diabetes Mother     Hypertension Mother     Osteoporosis Paternal Grandmother      Social History     Tobacco Use   Smoking Status Never Smoker   Smokeless Tobacco Never Used     Social History     Substance and Sexual Activity   Alcohol Use No        Social History     Tobacco History     Smoking Status  Never Smoker    Smokeless Tobacco Use  Never Used          Alcohol History     Alcohol Use Status  No          Drug Use     Drug Use Status  Yes Types  Marijuana (Weed)          Sexual Activity     Sexually Active  Yes Partners  Male              Allergies   Allergen Reactions    Latex     Codeine      Current Outpatient Medications   Medication Sig Dispense Refill    ALPRAZolam (XANAX) 0.25 MG tablet Take 0.25 mg by mouth 2 times daily.  butalbital-APAP-caffeine -40 MG CAPS per capsule Take 1 capsule by mouth every 4 hours as needed for Headaches      Drospirenone (SLYND) 4 MG TABS Take 1 tablet by mouth daily. 84 tablet 0    valACYclovir (VALTREX) 1 g tablet Take 1 tablet by mouth daily 30 tablet 0    gabapentin (NEURONTIN) 600 MG tablet Take 600 mg by mouth 3 times daily.  FLUoxetine (PROZAC) 20 MG capsule Take 20 mg by mouth daily       No current facility-administered medications for this visit. Subjective:     Review of Systems   Constitutional: Negative for chills and fever. Respiratory: Negative for cough and shortness of breath. Cardiovascular: Negative for chest pain, palpitations and leg swelling. Gastrointestinal: Negative for constipation, diarrhea, nausea and vomiting. Genitourinary: Positive for menstrual problem and pelvic pain. Negative for dyspareunia, dysuria, flank pain, vaginal bleeding, vaginal discharge and vaginal pain. Skin: Negative for color change and rash. Neurological: Negative for dizziness, light-headedness and headaches. Hematological: Negative for adenopathy. Does not bruise/bleed easily. Psychiatric/Behavioral: Negative for self-injury and suicidal ideas. Objective:     Physical Exam  Vitals and nursing note reviewed. Constitutional:       General: She is not in acute distress. Appearance: She is well-developed. She is not diaphoretic. HENT:      Head: Normocephalic and atraumatic.       Right Ear: External ear normal.      Left Ear: External ear normal.      Nose: Nose normal.   Eyes:      Pupils: Pupils are equal, round, and reactive to light. Neck:      Thyroid: No thyromegaly. Cardiovascular:      Rate and Rhythm: Normal rate and regular rhythm. Heart sounds: Normal heart sounds. No murmur heard. No friction rub. No gallop. Comments: No bilateral calf tenderness or swelling  Pulmonary:      Effort: Pulmonary effort is normal. No respiratory distress. Breath sounds: Normal breath sounds. No wheezing. Abdominal:      General: Bowel sounds are normal.      Palpations: Abdomen is soft. Tenderness: There is no abdominal tenderness. Genitourinary:     Comments: Breasts nipples everted, no masses or tenderness, does BSE  Vulva- varicosities right labia  Vagina-pink rugated  Cervix-firm, 2 cm. Nontender, freely movable, no lesions  Uterus-ant. Smooth, firm, nontender, freely movable  Adnexa-no masses or tenderness   Musculoskeletal:         General: Normal range of motion. Cervical back: Normal range of motion and neck supple. Lymphadenopathy:      Cervical: No cervical adenopathy. Skin:     General: Skin is warm and dry. Findings: No rash. Neurological:      Mental Status: She is alert and oriented to person, place, and time. Cranial Nerves: No cranial nerve deficit. Deep Tendon Reflexes: Reflexes are normal and symmetric. Psychiatric:         Behavior: Behavior normal.         Thought Content: Thought content normal.         Judgment: Judgment normal.       /70 (Site: Left Upper Arm, Position: Sitting, Cuff Size: Medium Adult)   Ht 5' 3\" (1.6 m)   Wt 155 lb 4.8 oz (70.4 kg)   LMP 12/31/2021 (Exact Date)   BMI 27.51 kg/m²     Assessment:       Diagnosis Orders   1. Women's annual routine gynecological examination  PAP SMEAR   2. Pelvic pain  US PELVIS COMPLETE    US NON OB TRANSVAGINAL   3.  Right ovarian cyst  US PELVIS COMPLETE    US NON OB TRANSVAGINAL   4. Mixed stress and urge incontinence  Renée Spurling, DO, Urogynecology, [place]   5. Pelvic congestion syndrome         Breast exam completed. Pelvic exam pap smear collected and sent. Cultures sent No    Plan:   Collect pap   BSE reviewed, Mammogram ordered: no  STD prevention reviewed  Gardisil counseling completed for all patients 10-35 yo  Cultures declined   TREAUSRE- refer Dr. Rohini Shoemaker unsure if she wants to do PFT  Pelvic pain-  Dx PCS she has follow up with Vascular doctor to discuss tx options for PCS/pelvic pain  Discussed possible management of pelvic pain discussed with patient. Pros and cons of medical management with OCPs- switching to another form of birth control that decreases inflammation or decreasing the number of cycles, etc, Depo-Provera injections or cyclic progestogens, IUD benefits discussed, surgical management with endometrial ablation, hysteroscopy D &C,  or hysterectomy. Also discussed [place] with interventional radiology for pelvic congestion. She would like to consider Hysterectomy but is willing to trial OCP will start Slynd today on menses still. She is going to also f/u with vascular doctor and if in 3 months no improvement in pelvic pain or sooner will consult with Dr. Jeff Franklin to discuss surgical management. Diet & Exercise reviewed with pt. Preventive  Health through PCP   RV3 months med check/Pev[place] pain          Orders Placed This Encounter   Procedures    US PELVIS COMPLETE     This procedure can be scheduled via [employer]. Access your [employer] account by visiting [email or web address]. Standing Status:   Future     Standing Expiration Date:   1/5/2023    US NON OB TRANSVAGINAL     Begin with transabdominal imaging. Standing Status:   Future     Standing Expiration Date:   1/5/2023    PAP SMEAR     Patient History:    Patient's last menstrual period was 12/31/2021 (exact date).   OBGYN Status: Having periods  Past Surgical History:  No date: BREAST BIOPSY  2008: BREAST ENHANCEMENT SURGERY; Bilateral      Comment:  removed in 2011  2011: BREAST IMPLANT REMOVAL; Bilateral  06/20/2016: COLPOSCOPY      Comment:  Dr. Jazmin Cano [facility]  2011: [address]; Bilateral  No date: TUBAL LIGATION      Comment:  [facility]   No date: WISDOM TOOTH EXTRACTION      Social History    Tobacco Use      Smoking status: Never Smoker      Smokeless tobacco: Never Used       Standing Status:   Future     Standing Expiration Date:   4/5/2022     Order Specific Question:   Collection Type     Answer: Thin Prep     Order Specific Question:   Prior Abnormal Pap Test     Answer:   No     Order Specific Question:   Screening or Diagnostic     Answer:   Screening     Order Specific Question:   HPV Requested? Answer:   Yes     Order Specific Question:   High Risk Patient     Answer:   N/A     Comments:   abnormal pap   Gutierrezadwoa Turner DO, Urogynecology, [place]     Referral Priority:   Routine     Referral Type:   Eval and Treat     Referral Reason:   Specialty Services Required     Referred to Provider:   Jenna Aguilar DO     Requested Specialty:   Urogynecology     Number of Visits Requested:   1     Orders Placed This Encounter   Medications    Drospirenone (SLYND) 4 MG TABS     Sig: Take 1 tablet by mouth daily. Dispense:  84 tablet     Refill:  0       Patient given educational materials - seepatient instructions. Discussed use, benefit, and side effects of prescribed medications. All patient questions answered. Pt voiced understanding. Reviewed health maintenance. Instructed to continue current medications, diet and exercise. Patient agreedwith treatment plan. Follow up as directed.       Electronically signed by BALDO Maldonado CNP on 1/5/2022at 5:10 PM

## 2022-01-06 ENCOUNTER — HOSPITAL ENCOUNTER (OUTPATIENT)
Age: 37
Setting detail: SPECIMEN
Discharge: HOME | End: 2022-01-06

## 2022-01-08 LAB
HPV SAMPLE: NORMAL
HPV, GENOTYPE 16: NOT DETECTED
HPV, GENOTYPE 18: NOT DETECTED
HPV, HIGH RISK OTHER: NOT DETECTED
HPV, INTERPRETATION: NORMAL
SPECIMEN DESCRIPTION: NORMAL

## 2022-01-13 LAB — CYTOLOGY REPORT: NORMAL

## 2022-01-26 ENCOUNTER — HOSPITAL ENCOUNTER (EMERGENCY)
Age: 37
Discharge: HOME | End: 2022-01-26
Attending: EMERGENCY MEDICINE
Payer: MEDICARE

## 2022-01-26 ENCOUNTER — APPOINTMENT (OUTPATIENT)
Dept: ULTRASOUND IMAGING | Age: 37
End: 2022-01-26
Payer: MEDICARE

## 2022-01-26 ENCOUNTER — APPOINTMENT (OUTPATIENT)
Dept: CT IMAGING | Age: 37
End: 2022-01-26
Payer: MEDICARE

## 2022-01-26 ENCOUNTER — APPOINTMENT (OUTPATIENT)
Dept: GENERAL RADIOLOGY | Age: 37
End: 2022-01-26
Payer: MEDICARE

## 2022-01-26 VITALS
BODY MASS INDEX: 25.69 KG/M2 | OXYGEN SATURATION: 100 % | DIASTOLIC BLOOD PRESSURE: 102 MMHG | HEIGHT: 63 IN | HEART RATE: 75 BPM | WEIGHT: 145 LBS | RESPIRATION RATE: 16 BRPM | SYSTOLIC BLOOD PRESSURE: 154 MMHG | TEMPERATURE: 98.2 F

## 2022-01-26 DIAGNOSIS — R10.13 DYSPEPSIA: Primary | ICD-10-CM

## 2022-01-26 LAB
-: NORMAL
ABSOLUTE EOS #: 0.1 K/UL (ref 0–0.4)
ABSOLUTE IMMATURE GRANULOCYTE: ABNORMAL K/UL (ref 0–0.3)
ABSOLUTE LYMPH #: 1.5 K/UL (ref 1–4.8)
ABSOLUTE MONO #: 0.4 K/UL (ref 0.1–1.3)
ALBUMIN SERPL-MCNC: 4.4 G/DL (ref 3.5–5.2)
ALBUMIN/GLOBULIN RATIO: ABNORMAL (ref 1–2.5)
ALP BLD-CCNC: 58 U/L (ref 35–104)
ALT SERPL-CCNC: 18 U/L (ref 5–33)
AMORPHOUS: NORMAL
ANION GAP SERPL CALCULATED.3IONS-SCNC: 10 MMOL/L (ref 9–17)
AST SERPL-CCNC: 16 U/L
BACTERIA: NORMAL
BASOPHILS # BLD: 1 % (ref 0–2)
BASOPHILS ABSOLUTE: 0 K/UL (ref 0–0.2)
BILIRUB SERPL-MCNC: 0.21 MG/DL (ref 0.3–1.2)
BILIRUBIN URINE: NEGATIVE
BUN BLDV-MCNC: 10 MG/DL (ref 6–20)
BUN/CREAT BLD: ABNORMAL (ref 9–20)
CALCIUM SERPL-MCNC: 9.6 MG/DL (ref 8.6–10.4)
CASTS UA: NORMAL /LPF
CHLORIDE BLD-SCNC: 103 MMOL/L (ref 98–107)
CO2: 26 MMOL/L (ref 20–31)
COLOR: YELLOW
COMMENT UA: ABNORMAL
CREAT SERPL-MCNC: 0.46 MG/DL (ref 0.5–0.9)
CRYSTALS, UA: NORMAL /HPF
DIFFERENTIAL TYPE: ABNORMAL
EOSINOPHILS RELATIVE PERCENT: 2 % (ref 0–4)
EPITHELIAL CELLS UA: NORMAL /HPF
GFR AFRICAN AMERICAN: >60 ML/MIN
GFR NON-AFRICAN AMERICAN: >60 ML/MIN
GFR SERPL CREATININE-BSD FRML MDRD: ABNORMAL ML/MIN/{1.73_M2}
GFR SERPL CREATININE-BSD FRML MDRD: ABNORMAL ML/MIN/{1.73_M2}
GLUCOSE BLD-MCNC: 95 MG/DL (ref 70–99)
GLUCOSE URINE: NEGATIVE
HCG(URINE) PREGNANCY TEST: NEGATIVE
HCT VFR BLD CALC: 35.7 % (ref 36–46)
HEMOGLOBIN: 12.1 G/DL (ref 12–16)
IMMATURE GRANULOCYTES: ABNORMAL %
KETONES, URINE: NEGATIVE
LEUKOCYTE ESTERASE, URINE: NEGATIVE
LIPASE: 20 U/L (ref 13–60)
LYMPHOCYTES # BLD: 29 % (ref 24–44)
MCH RBC QN AUTO: 29.3 PG (ref 26–34)
MCHC RBC AUTO-ENTMCNC: 34 G/DL (ref 31–37)
MCV RBC AUTO: 86.3 FL (ref 80–100)
MONOCYTES # BLD: 8 % (ref 1–7)
MUCUS: NORMAL
NITRITE, URINE: NEGATIVE
NRBC AUTOMATED: ABNORMAL PER 100 WBC
OTHER OBSERVATIONS UA: NORMAL
PDW BLD-RTO: 13.8 % (ref 11.5–14.9)
PH UA: 7 (ref 5–8)
PLATELET # BLD: 182 K/UL (ref 150–450)
PLATELET ESTIMATE: ABNORMAL
PMV BLD AUTO: 9 FL (ref 6–12)
POTASSIUM SERPL-SCNC: 4.2 MMOL/L (ref 3.7–5.3)
PROTEIN UA: NEGATIVE
RBC # BLD: 4.13 M/UL (ref 4–5.2)
RBC # BLD: ABNORMAL 10*6/UL
RBC UA: NORMAL /HPF
RENAL EPITHELIAL, UA: NORMAL /HPF
SEG NEUTROPHILS: 60 % (ref 36–66)
SEGMENTED NEUTROPHILS ABSOLUTE COUNT: 3.1 K/UL (ref 1.3–9.1)
SODIUM BLD-SCNC: 139 MMOL/L (ref 135–144)
SPECIFIC GRAVITY UA: 1.01 (ref 1–1.03)
TOTAL PROTEIN: 7.3 G/DL (ref 6.4–8.3)
TRICHOMONAS: NORMAL
TROPONIN INTERP: ABNORMAL
TROPONIN INTERP: ABNORMAL
TROPONIN T: ABNORMAL NG/ML
TROPONIN T: ABNORMAL NG/ML
TROPONIN, HIGH SENSITIVITY: 17 NG/L (ref 0–14)
TROPONIN, HIGH SENSITIVITY: 18 NG/L (ref 0–14)
TURBIDITY: CLEAR
URINE HGB: ABNORMAL
UROBILINOGEN, URINE: NORMAL
WBC # BLD: 5.2 K/UL (ref 3.5–11)
WBC # BLD: ABNORMAL 10*3/UL
WBC UA: NORMAL /HPF
YEAST: NORMAL

## 2022-01-26 PROCEDURE — 36415 COLL VENOUS BLD VENIPUNCTURE: CPT

## 2022-01-26 PROCEDURE — 85025 COMPLETE CBC W/AUTO DIFF WBC: CPT

## 2022-01-26 PROCEDURE — 84484 ASSAY OF TROPONIN QUANT: CPT

## 2022-01-26 PROCEDURE — 99284 EMERGENCY DEPT VISIT MOD MDM: CPT

## 2022-01-26 PROCEDURE — 71045 X-RAY EXAM CHEST 1 VIEW: CPT

## 2022-01-26 PROCEDURE — 96375 TX/PRO/DX INJ NEW DRUG ADDON: CPT

## 2022-01-26 PROCEDURE — 76705 ECHO EXAM OF ABDOMEN: CPT

## 2022-01-26 PROCEDURE — 2580000003 HC RX 258: Performed by: EMERGENCY MEDICINE

## 2022-01-26 PROCEDURE — 80053 COMPREHEN METABOLIC PANEL: CPT

## 2022-01-26 PROCEDURE — 81025 URINE PREGNANCY TEST: CPT

## 2022-01-26 PROCEDURE — 93005 ELECTROCARDIOGRAM TRACING: CPT | Performed by: EMERGENCY MEDICINE

## 2022-01-26 PROCEDURE — 83690 ASSAY OF LIPASE: CPT

## 2022-01-26 PROCEDURE — 81001 URINALYSIS AUTO W/SCOPE: CPT

## 2022-01-26 PROCEDURE — 6360000002 HC RX W HCPCS: Performed by: EMERGENCY MEDICINE

## 2022-01-26 PROCEDURE — 6370000000 HC RX 637 (ALT 250 FOR IP): Performed by: EMERGENCY MEDICINE

## 2022-01-26 PROCEDURE — 74177 CT ABD & PELVIS W/CONTRAST: CPT

## 2022-01-26 PROCEDURE — 6360000004 HC RX CONTRAST MEDICATION: Performed by: EMERGENCY MEDICINE

## 2022-01-26 PROCEDURE — 96374 THER/PROPH/DIAG INJ IV PUSH: CPT

## 2022-01-26 RX ORDER — 0.9 % SODIUM CHLORIDE 0.9 %
1000 INTRAVENOUS SOLUTION INTRAVENOUS ONCE
Status: COMPLETED | OUTPATIENT
Start: 2022-01-26 | End: 2022-01-26

## 2022-01-26 RX ORDER — MAGNESIUM HYDROXIDE/ALUMINUM HYDROXICE/SIMETHICONE 120; 1200; 1200 MG/30ML; MG/30ML; MG/30ML
30 SUSPENSION ORAL ONCE
Status: COMPLETED | OUTPATIENT
Start: 2022-01-26 | End: 2022-01-26

## 2022-01-26 RX ORDER — ONDANSETRON 4 MG/1
4 TABLET, ORALLY DISINTEGRATING ORAL EVERY 8 HOURS PRN
Qty: 20 TABLET | Refills: 0 | Status: SHIPPED | OUTPATIENT
Start: 2022-01-26

## 2022-01-26 RX ORDER — KETOROLAC TROMETHAMINE 30 MG/ML
30 INJECTION, SOLUTION INTRAMUSCULAR; INTRAVENOUS ONCE
Status: COMPLETED | OUTPATIENT
Start: 2022-01-26 | End: 2022-01-26

## 2022-01-26 RX ORDER — SODIUM CHLORIDE 0.9 % (FLUSH) 0.9 %
10 SYRINGE (ML) INJECTION PRN
Status: DISCONTINUED | OUTPATIENT
Start: 2022-01-26 | End: 2022-01-26 | Stop reason: HOSPADM

## 2022-01-26 RX ORDER — LIDOCAINE HYDROCHLORIDE 20 MG/ML
15 SOLUTION OROPHARYNGEAL ONCE
Status: COMPLETED | OUTPATIENT
Start: 2022-01-26 | End: 2022-01-26

## 2022-01-26 RX ORDER — ONDANSETRON 2 MG/ML
4 INJECTION INTRAMUSCULAR; INTRAVENOUS ONCE
Status: COMPLETED | OUTPATIENT
Start: 2022-01-26 | End: 2022-01-26

## 2022-01-26 RX ORDER — 0.9 % SODIUM CHLORIDE 0.9 %
80 INTRAVENOUS SOLUTION INTRAVENOUS ONCE
Status: COMPLETED | OUTPATIENT
Start: 2022-01-26 | End: 2022-01-26

## 2022-01-26 RX ADMIN — Medication 15 ML: at 14:50

## 2022-01-26 RX ADMIN — IOPAMIDOL 75 ML: 755 INJECTION, SOLUTION INTRAVENOUS at 18:22

## 2022-01-26 RX ADMIN — ALUMINUM HYDROXIDE, MAGNESIUM HYDROXIDE, AND SIMETHICONE 30 ML: 200; 200; 20 SUSPENSION ORAL at 14:50

## 2022-01-26 RX ADMIN — KETOROLAC TROMETHAMINE 30 MG: 30 INJECTION, SOLUTION INTRAMUSCULAR; INTRAVENOUS at 18:01

## 2022-01-26 RX ADMIN — ONDANSETRON 4 MG: 2 INJECTION INTRAMUSCULAR; INTRAVENOUS at 18:01

## 2022-01-26 RX ADMIN — SODIUM CHLORIDE 1000 ML: 9 INJECTION, SOLUTION INTRAVENOUS at 14:49

## 2022-01-26 RX ADMIN — SODIUM CHLORIDE, PRESERVATIVE FREE 10 ML: 5 INJECTION INTRAVENOUS at 18:23

## 2022-01-26 RX ADMIN — SODIUM CHLORIDE 80 ML: 9 INJECTION, SOLUTION INTRAVENOUS at 18:23

## 2022-01-26 ASSESSMENT — ENCOUNTER SYMPTOMS
BACK PAIN: 0
BLOOD IN STOOL: 0
NAUSEA: 1
COUGH: 0
DIARRHEA: 0
SORE THROAT: 0
ABDOMINAL PAIN: 0
SHORTNESS OF BREATH: 0
VOMITING: 0
TROUBLE SWALLOWING: 0
CONSTIPATION: 0
COLOR CHANGE: 0

## 2022-01-26 ASSESSMENT — PAIN DESCRIPTION - DESCRIPTORS: DESCRIPTORS: ACHING;BURNING

## 2022-01-26 ASSESSMENT — PAIN DESCRIPTION - FREQUENCY: FREQUENCY: INTERMITTENT

## 2022-01-26 ASSESSMENT — PAIN DESCRIPTION - PAIN TYPE: TYPE: ACUTE PAIN

## 2022-01-26 ASSESSMENT — PAIN SCALES - GENERAL
PAINLEVEL_OUTOF10: 8
PAINLEVEL_OUTOF10: 8

## 2022-01-26 ASSESSMENT — PAIN DESCRIPTION - LOCATION: LOCATION: CHEST

## 2022-01-26 NOTE — ED TRIAGE NOTES
Epigastric chest pain radiating to rt ribs and back since 0300 yesterday. Pt has a history of acid reflux and has been taking her prilosec without relief.

## 2022-01-26 NOTE — ED PROVIDER NOTES
[address]  EMERGENCY DEPARTMENT ENCOUNTER    Pt Name: Iglesia Shell  MRN: [id number]  YOB: 1985  Date of evaluation:1/26/22  PCP: Gayla Durbin       Chief Complaint   Patient presents with    Chest Pain       HISTORY OF PRESENT ILLNESS    Iglesia Shell is a 39 y.o. female who presents with a chief complaint of chest pain. Patient states he woke up around 3 AM with sharp right-sided chest pain. She states she describes as feeling like there is a \"bulge\" under her right breast.  Denies any shortness of breath. Reports some nausea and pain with swallowing. Also feels like she has been belching and feels like belching improves her symptoms. No changes in bowel or bladder habits. She does have a history of GERD and takes Prilosec. Did not have any large spicy or fatty foods last night before going to bed. No fevers, chills other illnesses. Has no gallbladder issues that she knows about. Symptoms are acute. Symptomatic. Nothing else make symptoms better or worse. Patient has no other complaints at this time. REVIEW OF SYSTEMS       Review of Systems   Constitutional: Negative for chills, fatigue and fever. HENT: Negative for congestion, ear pain, sore throat and trouble swallowing. Eyes: Negative for visual disturbance. Respiratory: Negative for cough and shortness of breath. Cardiovascular: Positive for chest pain. Negative for palpitations and leg swelling. Gastrointestinal: Positive for nausea. Negative for abdominal pain, blood in stool, constipation, diarrhea and vomiting. Genitourinary: Negative for dysuria and flank pain. Musculoskeletal: Negative for arthralgias, back pain, myalgias and neck pain. Skin: Negative for color change, rash and wound. Neurological: Negative for dizziness, weakness, light-headedness, numbness and headaches. Psychiatric/Behavioral: Negative for confusion. All other systems reviewed and are negative.     Negative in 10 essential Systems except as mentioned above and in the HPI. PAST MEDICAL HISTORY     Past Medical History:   Diagnosis Date    Anxiety     Asthma     Bladder prolapse, female, acquired     HSV (herpes simplex virus) anogenital infection 2017    Mitral valve prolapse 2005    with regurg, has echo q 6 months, premed with dental visits    Ovary anomaly     reflux    Pelvic congestive syndrome     POTS (postural orthostatic tachycardia syndrome)     Pulmonary hypertension ([facility] [address]          SURGICAL HISTORY      has a past surgical history that includes Breast biopsy; [place] tooth extraction; Nasal sinus surgery (Bilateral, ); Breast enhancement surgery (Bilateral, ); Breast Implant Removal (Bilateral, ); Tubal ligation; and Colposcopy (2016). CURRENT MEDICATIONS       Previous Medications    ALPRAZOLAM (XANAX) 0.25 MG TABLET    Take 0.25 mg by mouth 2 times daily. BUTALBITAL-APAP-CAFFEINE -40 MG CAPS PER CAPSULE    Take 1 capsule by mouth every 4 hours as needed for Headaches    DROSPIRENONE (SLYND) 4 MG TABS    Take 1 tablet by mouth daily. FLUOXETINE (PROZAC) 20 MG CAPSULE    Take 20 mg by mouth daily    GABAPENTIN (NEURONTIN) 600 MG TABLET    Take 600 mg by mouth 3 times daily. VALACYCLOVIR (VALTREX) 1 G TABLET    Take 1 tablet by mouth daily       ALLERGIES     is allergic to latex and codeine. FAMILY HISTORY     She indicated that her mother is alive. She indicated that her father is alive. She indicated that her maternal grandmother is . She indicated that her maternal grandfather is alive. She indicated that the status of her paternal grandmother is unknown. She indicated that her paternal grandfather is .      family history includes Colon Cancer in her maternal grandfather; Diabetes in her maternal grandmother and mother; Heart Attack in her maternal grandmother; Hypertension in her maternal grandfather and mother; Osteoporosis in her paternal grandmother; Sudden Death in her paternal grandfather. SOCIAL HISTORY      reports that she has never smoked. She has never used smokeless tobacco. She reports current drug use. Drug: Marijuana Alpheus Kathya). She reports that she does not drink alcohol. PHYSICAL EXAM     INITIAL VITALS:  height is 5' 3\" (1.6 m) and weight is 145 lb (65.8 kg). Her oral temperature is 98.2 °F (36.8 °C). Her blood pressure is 154/102 (abnormal) and her pulse is 75. Her respiration is 16 and oxygen saturation is 100%. Physical Exam  Vitals and nursing note reviewed. Constitutional:       General: She is not in acute distress. HENT:      Head: Normocephalic and atraumatic. Eyes:      Conjunctiva/sclera: Conjunctivae normal.      Pupils: Pupils are equal, round, and reactive to light. Cardiovascular:      Rate and Rhythm: Normal rate and regular rhythm. Heart sounds: Normal heart sounds. No murmur heard. Pulmonary:      Effort: Pulmonary effort is normal. No respiratory distress. Breath sounds: Normal breath sounds. Abdominal:      General: Bowel sounds are normal. There is no distension. Palpations: Abdomen is soft. Tenderness: There is abdominal tenderness in the right upper quadrant. Musculoskeletal:         General: No tenderness. Cervical back: Neck supple. Lymphadenopathy:      Cervical: No cervical adenopathy. Skin:     General: Skin is warm and dry. Findings: No rash. Neurological:      Mental Status: She is alert and oriented to person, place, and time. Psychiatric:         Judgment: Judgment normal.           DIFFERENTIAL DIAGNOSIS/MDM:   57-year-old female presents with right-sided chest pain that woke her up from sleep last night. She is afebrile, nontoxic, hypertensive otherwise vital signs normal.  No acute distress. Patient describes her symptoms and based on her exam I am concerned for gallbladder pathology.   We will get a gallbladder ultrasound, blood work. Lower suspicion for cardiac pathology however we will still get cardiac work-up. She is PERC negative. Very low suspicion for PE.    DIAGNOSTIC RESULTS     EKG: All EKG's are interpreted by the Emergency Department Physician who either signs or Co-signs this chart in the absence of a cardiologist.    EKG Interpretation    Interpreted by me    Rhythm: normal sinus   Rate: normal  Axis: normal  Ectopy: none  Conduction: normal  ST Segments: no acute change  T Waves: no acute change  Q Waves: none    Clinical Impression: Nonspecific EKG    RADIOLOGY:   I directly visualized the following  images and reviewed the radiologist interpretations:  CT ABDOMEN PELVIS W IV CONTRAST Additional Contrast? None   Final Result   1. No acute findings in the abdomen or pelvis. 2.  2.8 cm right ovarian cyst.  No follow-up is required. US GALLBLADDER RUQ   Final Result   Negative right upper quadrant ultrasound apart from right renal cyst as   measured above. XR CHEST PORTABLE   Final Result   No acute process.                  ED BEDSIDE ULTRASOUND:      LABS:  Labs Reviewed   TROPONIN - Abnormal; Notable for the following components:       Result Value    Troponin, High Sensitivity 17 (*)     All other components within normal limits   TROPONIN - Abnormal; Notable for the following components:    Troponin, High Sensitivity 18 (*)     All other components within normal limits   CBC WITH AUTO DIFFERENTIAL - Abnormal; Notable for the following components:    Hematocrit 35.7 (*)     Monocytes 8 (*)     All other components within normal limits   COMPREHENSIVE METABOLIC PANEL - Abnormal; Notable for the following components:    CREATININE 0.46 (*)     Total Bilirubin 0.21 (*)     All other components within normal limits   URINE RT REFLEX TO CULTURE - Abnormal; Notable for the following components:    Urine Hgb LARGE (*)     All other components within normal limits   LIPASE   PREGNANCY, URINE MICROSCOPIC URINALYSIS         EMERGENCY DEPARTMENT COURSE:   Vitals:    Vitals:    01/26/22 1344 01/26/22 1728 01/26/22 1729   BP: (!) 153/113 (!) 154/102    Pulse: 96  75   Resp: 16     Temp: 98.2 °F (36.8 °C)     TempSrc: Oral     SpO2: 98% 100%    Weight: 145 lb (65.8 kg)     Height: 5' 3\" (1.6 m)       6:16 PM EST  Is work-up is mostly unremarkable. Her gallbladder ultrasound is negative. No evidence of any gallbladder pathology. Troponin is 17 and 18. EKG shows no ischemic findings. Patient still nauseous, vomiting here after Zofran, GI cocktail. We are going to get a CT abdomen pelvis as patient is still symptomatic. After discussion with the patient she would feel more comfortable going home, trying to treat this at home and then coming back if things worsen at all. I think this is appropriate. 8:11 PM EST  The abdomen pelvis is unremarkable. Patient actually feels a lot better after Zofran, Toradol. Advised to take Tylenol and Motrin as needed for pain but only take Motrin if she does take it with food. Advised to keep her self well-hydrated, return anytime symptoms worsen. Also referred her to gastroenterology. She is agreeable with plan will be discharged home today. CRITICALCARE:      CONSULTS:  None      PROCEDURES:      FINAL IMPRESSION      1.  Dyspepsia        DISPOSITION/PLAN   DISPOSITION          PATIENT REFERRED TO:  Aden [facility]  [address] [facility] [address]    Schedule an appointment as soon as possible for a visit       [facility]  [address]  [address] [facility]  [phone]    As needed, If symptoms worsen    Tyler Crowder MD  [address] [place] [address] [address]  [phone]    Schedule an appointment as soon as possible for a visit         DISCHARGE MEDICATIONS:  New Prescriptions    ONDANSETRON (ZOFRAN ODT) 4 MG DISINTEGRATING TABLET    Take 1 tablet by mouth every 8 hours as needed for Nausea       The care is provided during an unprecedented national emergency due to the novel coronavirus, COVID-19.     (Please note that portions ofthis note were completed with a voice recognition program.  Efforts were made to edit the dictations but occasionally words are mis-transcribed.)    Osmar Santos DO  Attending Emergency Physician          Osmar Santos DO  01/26/22 2012

## 2022-01-27 LAB
EKG ATRIAL RATE: 81 BPM
EKG P AXIS: 61 DEGREES
EKG P-R INTERVAL: 138 MS
EKG Q-T INTERVAL: 352 MS
EKG QRS DURATION: 78 MS
EKG QTC CALCULATION (BAZETT): 408 MS
EKG R AXIS: 36 DEGREES
EKG T AXIS: 39 DEGREES
EKG VENTRICULAR RATE: 81 BPM

## 2022-01-27 PROCEDURE — 93010 ELECTROCARDIOGRAM REPORT: CPT | Performed by: INTERNAL MEDICINE

## 2022-02-01 ENCOUNTER — OFFICE VISIT (OUTPATIENT)
Dept: GASTROENTEROLOGY | Age: 37
End: 2022-02-01
Payer: MEDICARE

## 2022-02-01 ENCOUNTER — HOSPITAL ENCOUNTER (OUTPATIENT)
Dept: WOMENS IMAGING | Age: 37
Discharge: HOME | End: 2022-02-03
Payer: MEDICARE

## 2022-02-01 VITALS
SYSTOLIC BLOOD PRESSURE: 122 MMHG | HEIGHT: 63 IN | WEIGHT: 149.1 LBS | BODY MASS INDEX: 26.42 KG/M2 | DIASTOLIC BLOOD PRESSURE: 87 MMHG | OXYGEN SATURATION: 97 % | HEART RATE: 87 BPM

## 2022-02-01 DIAGNOSIS — R06.83 SNORING: ICD-10-CM

## 2022-02-01 DIAGNOSIS — F41.9 ANXIETY: ICD-10-CM

## 2022-02-01 DIAGNOSIS — F32.A DEPRESSION, UNSPECIFIED DEPRESSION TYPE: ICD-10-CM

## 2022-02-01 DIAGNOSIS — F43.9 STRESS: ICD-10-CM

## 2022-02-01 DIAGNOSIS — N83.201 RIGHT OVARIAN CYST: ICD-10-CM

## 2022-02-01 DIAGNOSIS — R10.2 PELVIC PAIN: ICD-10-CM

## 2022-02-01 DIAGNOSIS — K21.9 CHRONIC GERD: Primary | ICD-10-CM

## 2022-02-01 PROCEDURE — G8484 FLU IMMUNIZE NO ADMIN: HCPCS | Performed by: INTERNAL MEDICINE

## 2022-02-01 PROCEDURE — G8419 CALC BMI OUT NRM PARAM NOF/U: HCPCS | Performed by: INTERNAL MEDICINE

## 2022-02-01 PROCEDURE — 1036F TOBACCO NON-USER: CPT | Performed by: INTERNAL MEDICINE

## 2022-02-01 PROCEDURE — 99204 OFFICE O/P NEW MOD 45 MIN: CPT | Performed by: INTERNAL MEDICINE

## 2022-02-01 PROCEDURE — 76830 TRANSVAGINAL US NON-OB: CPT

## 2022-02-01 PROCEDURE — G8427 DOCREV CUR MEDS BY ELIG CLIN: HCPCS | Performed by: INTERNAL MEDICINE

## 2022-02-01 RX ORDER — HYDROCHLOROTHIAZIDE 12.5 MG/1
12.5 TABLET ORAL DAILY
COMMUNITY
Start: 2022-01-28

## 2022-02-01 RX ORDER — FLUOXETINE HYDROCHLORIDE 40 MG/1
40 CAPSULE ORAL DAILY
COMMUNITY
Start: 2022-01-18 | End: 2022-11-01

## 2022-02-01 RX ORDER — OMEPRAZOLE 20 MG/1
20 CAPSULE, DELAYED RELEASE ORAL 2 TIMES DAILY
COMMUNITY
Start: 2022-01-19

## 2022-02-01 RX ORDER — ALBUTEROL SULFATE 90 UG/1
2 AEROSOL, METERED RESPIRATORY (INHALATION) EVERY 6 HOURS PRN
COMMUNITY
Start: 2022-01-12

## 2022-02-01 RX ORDER — GABAPENTIN 300 MG/1
CAPSULE ORAL
COMMUNITY
Start: 2021-12-02 | End: 2022-02-01 | Stop reason: SDUPTHER

## 2022-02-01 RX ORDER — GABAPENTIN 800 MG/1
800 TABLET ORAL 2 TIMES DAILY
COMMUNITY
Start: 2022-01-31

## 2022-02-01 RX ORDER — CLOBETASOL PROPIONATE 0.05 G/100ML
SHAMPOO TOPICAL DAILY
COMMUNITY
Start: 2021-11-18

## 2022-02-01 ASSESSMENT — ENCOUNTER SYMPTOMS
SORE THROAT: 1
TROUBLE SWALLOWING: 0
BACK PAIN: 0
SHORTNESS OF BREATH: 0
ANAL BLEEDING: 0
COUGH: 1
RECTAL PAIN: 0
DIARRHEA: 1
ABDOMINAL DISTENTION: 1
NAUSEA: 1
VOICE CHANGE: 0
CONSTIPATION: 0
BLOOD IN STOOL: 0
CHOKING: 1
VOMITING: 1
ABDOMINAL PAIN: 1

## 2022-02-01 NOTE — PROGRESS NOTES
Reason for Referral:   No referring provider defined for this encounter. Chief Complaint   Patient presents with    New Patient     Patient is here today as a new patient    Abdominal Pain     Patient is here today to to epigastric chest pain and dyspepsia       1. Chronic GERD    2. Snoring    3. Anxiety    4. Stress    5. Depression, unspecified depression type            HISTORY OF PRESENT ILLNESS: Renée Ireland is a 39 y.o. female with a past history remarkable for , referred for evaluation of   Chief Complaint   Patient presents with    New Patient     Patient is here today as a new patient    Abdominal Pain     Patient is here today to to epigastric chest pain and dyspepsia   . Patient seen with the symptoms of chronic heartburn. Patient states that she had heartburns since she was a teenager. Has been taking over-the-counter medication and prescription medication intermittently since then. At present she has been taking omeprazole and Pepcid. Still she is having significant heartburns. However he denies symptoms suggestive of extra esophageal manifestation of GERD. Occasionally, she has dysphagia with solid food. The food appears to be lodging in the cervical esophagus and she regurgitates and brings it out. No weight loss. No symptom of complete obstruction of esophagus. She also has abdominal distention bloating. Has the cramps. She had 4-5 liquid stools a day. Small to moderate amount without hematochezia. No nocturnal bowel movements. She said that she last about 25 pounds in the last 5 months even though she has good appetite. Denies urinary symptoms. No pelvic discharge. Recently she had an ultrasound of the gallbladder done and was reported to be within normal limits. CT scan of the abdomen and pelvis reported to be within normal limits. This patient has a significant psychological issues.   He was under the care of psychiatrist in the past and presently is not able to find a psychiatrist for follow-up. She has a significant anxiety, depression, panic issues. On further discussion she also is having severe snoring issues. Not clear whether she has sleep apnea. Her labs which were done on 1/26/2022 including lipase, CBC, CMP reviewed and nonspecific. Past Medical,Family, and Social History reviewed and does contribute to the patient presentingcondition. Patient's PMH/PSH,SH,PSYCH Hx, MEDs, ALLERGIES, and ROS were all reviewed and updated in the appropriate sections.     PAST MEDICAL HISTORY:  Past Medical History:   Diagnosis Date    Anxiety     Asthma     Bladder prolapse, female, acquired     HSV (herpes simplex virus) anogenital infection 12/26/2017    Mitral valve prolapse 2005    with regurg, has echo q 6 months, premed with dental visits    Ovary anomaly     reflux    Pelvic congestive syndrome     POTS (postural orthostatic tachycardia syndrome) 2012    Pulmonary hypertension ([facility] [address]        Past Surgical History:   Procedure Laterality Date    BREAST BIOPSY      BREAST ENHANCEMENT SURGERY Bilateral 2008    removed in 2011   Matthew Don Bilateral 2011    COLPOSCOPY  06/20/2016    Dr. Frances Craey Bilateral 2011   [address]       2015 at [facility]     WISDOM TOOTH EXTRACTION         CURRENT MEDICATIONS:    Current Outpatient Medications:     albuterol sulfate  (90 Base) MCG/ACT inhaler, Inhale 2 puffs into the lungs every 6 hours as needed, Disp: , Rfl:     Cholecalciferol 50 MCG (2000 UT) TABS, Take 2,000 Units by mouth daily, Disp: , Rfl:     Clobetasol Propionate 0.05 % SHAM, Apply topically daily, Disp: , Rfl:     gabapentin (NEURONTIN) 800 MG tablet, , Disp: , Rfl:     hydroCHLOROthiazide (HYDRODIURIL) 12.5 MG tablet, Take 12.5 mg by mouth daily, Disp: , Rfl:     omeprazole (PRILOSEC) 20 MG delayed release capsule, , Disp: , Rfl:     FLUoxetine (PROZAC) 40 MG capsule, , Disp: , Rfl:     ondansetron (ZOFRAN ODT) 4 MG disintegrating tablet, Take 1 tablet by mouth every 8 hours as needed for Nausea, Disp: 20 tablet, Rfl: 0    ALPRAZolam (XANAX) 0.25 MG tablet, Take 0.25 mg by mouth 2 times daily. , Disp: , Rfl:     butalbital-APAP-caffeine -40 MG CAPS per capsule, Take 1 capsule by mouth every 4 hours as needed for Headaches, Disp: , Rfl:     Drospirenone (SLYND) 4 MG TABS, Take 1 tablet by mouth daily. , Disp: 84 tablet, Rfl: 0    valACYclovir (VALTREX) 1 g tablet, Take 1 tablet by mouth daily, Disp: 30 tablet, Rfl: 0    gabapentin (NEURONTIN) 600 MG tablet, Take 600 mg by mouth 3 times daily. , Disp: , Rfl:     ALLERGIES:   Allergies   Allergen Reactions    Latex     Codeine        FAMILY HISTORY:       Problem Relation Age of Onset    Sudden Death Paternal Grandfather         suicide    Diabetes Maternal Grandmother     Heart Attack Maternal Grandmother     Hypertension Maternal Grandfather     Colon Cancer Maternal Grandfather     Diabetes Mother     Hypertension Mother     Osteoporosis Paternal Grandmother          SOCIAL HISTORY:   Social History     Socioeconomic History    Marital status:      Spouse name: Not on file    Number of children: Not on file    Years of education: Not on file    Highest education level: Not on file   Occupational History    Not on file   Tobacco Use    Smoking status: Never Smoker    Smokeless tobacco: Never Used   Vaping Use    Vaping Use: Some days    Substances: Never   Substance and Sexual Activity    Alcohol use: No    Drug use: Yes     Types: Marijuana [facility])    Sexual activity: Yes     Partners: Male   Other Topics Concern    Not on file   Social History Narrative    Not on file     Social Determinants of Health     Financial Resource Strain:     Difficulty of Paying Living Expenses: Not on file   Food Insecurity:     Worried About Running Out of Food in the Last Year: Not on file    [address] in the Last Year: Not on file   Transportation Needs:     Lack of Transportation (Medical): Not on file    Lack of Transportation (Non-Medical): Not on file   Physical Activity:     Days of Exercise per Week: Not on file    Minutes of Exercise per Session: Not on file   Stress:     Feeling of Stress : Not on file   Social Connections:     Frequency of Communication with Friends and Family: Not on file    Frequency of Social Gatherings with Friends and Family: Not on file    Attends [employer] Services: Not on file    Active Member of [address] [place] [address] [employer] Group or Organizations: Not on file    Attends Club or Organization Meetings: Not on file    Marital Status: Not on file   Intimate Partner Violence:     Fear of Current or Ex-Partner: Not on file    Emotionally Abused: Not on file    Physically Abused: Not on file    Sexually Abused: Not on file   Housing Stability:     Unable to Pay for Housing in the Last Year: Not on file    Number of [place] in the Last Year: Not on file    Unstable Housing in the Last Year: Not on file       REVIEW OF SYSTEMS:       Review of Systems   Constitutional: Negative for appetite change, fatigue and unexpected weight change. HENT: Positive for sore throat. Negative for dental problem, trouble swallowing and voice change. Eyes: Negative for visual disturbance. Respiratory: Positive for cough and choking. Negative for shortness of breath. Cardiovascular: Positive for chest pain. Negative for leg swelling. Gastrointestinal: Positive for abdominal distention, abdominal pain, diarrhea, nausea and vomiting. Negative for anal bleeding, blood in stool, constipation and rectal pain. Genitourinary: Negative for difficulty urinating. Musculoskeletal: Negative for back pain, joint swelling and myalgias. Neurological: Positive for headaches. Negative for dizziness, tremors, weakness, light-headedness and numbness.    Hematological: Bruises/bleeds easily. Psychiatric/Behavioral: Positive for sleep disturbance. The patient is nervous/anxious. PHYSICAL EXAMINATION: Vital signs reviewed per the nursing documentation. /87   Pulse 87   Ht 5' 3\" (1.6 m)   Wt 149 lb 1.6 oz (67.6 kg)   SpO2 97%   BMI 26.41 kg/m²   Body mass index is 26.41 kg/m². Physical Exam      LABORATORY DATA: Reviewed  Lab Results   Component Value Date    WBC 5.2 01/26/2022    HGB 12.1 01/26/2022    HCT 35.7 (L) 01/26/2022    MCV 86.3 01/26/2022     01/26/2022     01/26/2022    K 4.2 01/26/2022     01/26/2022    CO2 26 01/26/2022    BUN 10 01/26/2022    CREATININE 0.46 (L) 01/26/2022    LABALBU 4.4 01/26/2022    BILITOT 0.21 (L) 01/26/2022    ALKPHOS 58 01/26/2022    AST 16 01/26/2022    ALT 18 01/26/2022         Lab Results   Component Value Date    RBC 4.13 01/26/2022    HGB 12.1 01/26/2022    MCV 86.3 01/26/2022    MCH 29.3 01/26/2022    MCHC 34.0 01/26/2022    RDW 13.8 01/26/2022    MPV 9.0 01/26/2022    BASOPCT 1 01/26/2022    LYMPHSABS 1.50 01/26/2022    MONOSABS 0.40 01/26/2022    NEUTROABS 3.10 01/26/2022    EOSABS 0.10 01/26/2022    BASOSABS 0.00 01/26/2022         DIAGNOSTIC TESTING:     CT ABDOMEN PELVIS W IV CONTRAST Additional Contrast? None    Result Date: 1/26/2022  EXAMINATION: CT OF THE ABDOMEN AND PELVIS WITH CONTRAST 1/26/2022 6:15 pm TECHNIQUE: CT of the abdomen and pelvis was performed with the administration of intravenous contrast. Multiplanar reformatted images are provided for review. Dose modulation, iterative reconstruction, and/or weight based adjustment of the mA/kV was utilized to reduce the radiation dose to as low as reasonably achievable. COMPARISON: None.  HISTORY: ORDERING SYSTEM PROVIDED HISTORY: upper abd pain, nausea TECHNOLOGIST PROVIDED HISTORY: upper abd pain, nausea Decision Support Exception - unselect if not a suspected or confirmed emergency medical condition->Emergency Medical Condition (MA) Reason for Exam: upper abd pain, nausea x 1 day, pt states esophageal issues as well FINDINGS: Lower Chest: Lung bases are clear. Organs: The liver is normal.  Normal gallbladder, pancreas, and spleen. Normal adrenal glands. The left kidney is unremarkable. 1.5 cm simple cyst within the right kidney. GI/Bowel: The distal esophagus and stomach appear normal.  Small bowel loops appear normal without evidence of obstruction. The large bowel and appendix are normal. Pelvis: The uterus appears within normal limits. There is a 2.8 cm right ovarian cyst.  No follow-up is required. Left ovary appears within normal limits. No free fluid. Peritoneum/Retroperitoneum: The aorta is normal caliber. No lymphadenopathy. Bones/Soft Tissues: No hernia. No suspicious osseous lesion. 1.  No acute findings in the abdomen or pelvis. 2.  2.8 cm right ovarian cyst.  No follow-up is required. US GALLBLADDER RUQ    Result Date: 1/26/2022  EXAMINATION: RIGHT UPPER QUADRANT ULTRASOUND 1/26/2022 2:19 pm COMPARISON: 04/11/2014 HISTORY: ORDERING SYSTEM PROVIDED HISTORY: nausea, belching, R chest pain FINDINGS: LIVER:  The liver demonstrates normal echogenicity without evidence of intrahepatic biliary ductal dilatation. Liver length is 17.6 cm. Portal vein demonstrates hepatopetal flow. BILIARY SYSTEM:  Gallbladder is unremarkable without evidence of pericholecystic fluid, wall thickening or stones. Negative sonographic Bassett's sign. Common bile duct is within normal limits measuring 4 mm. RIGHT KIDNEY: The right kidney is without evidence of hydronephrosis. Right renal length is 10.56 cm.  2 cm simple cyst. PANCREAS:  Visualized portions of the pancreas are unremarkable. OTHER: No evidence of right upper quadrant ascites. Negative right upper quadrant ultrasound apart from right renal cyst as measured above.      XR CHEST PORTABLE    Result Date: 1/26/2022  EXAMINATION: ONE XRAY VIEW OF THE CHEST 1/26/2022 2:17 pm COMPARISON: 01/27/2013 HISTORY: ORDERING SYSTEM PROVIDED HISTORY: R side chest pain TECHNOLOGIST PROVIDED HISTORY: R side chest pain Reason for Exam: right sided chest pain, patients also states that it feels lke she has a lump in her throat when swallowing and it makes the pain worse FINDINGS: The lungs are without acute focal process. There is no effusion or pneumothorax. The cardiomediastinal silhouette is without acute process. The osseous structures are without acute process. No acute process. IMPRESSION: Ms. Alba Coelho is a 39 y.o. female with     Assessment:  1. Chronic GERD    2. Snoring    3. Anxiety    4. Stress    5. Depression, unspecified depression type        Plan:  Patient has significant heartburns. She has a severe snoring. Not clear whether some of her symptoms related to snoring, psychological issues. However as she is having chronic GERD needs further investigations to evaluate esophageal pathology. After discussion, patient understood risks and benefits of the procedure and verbalized the consent. Also this patient needs sleep studies to evaluate snoring and to rule out sleep apnea issues. After discussion patient understood and agreed. This patient needs psychiatric consultation and follow-up. After discussion, patient will check with her PCP regarding psychiatric evaluation. Basing on the results of this EGD and a sleep study will plan further management. Spent 30 minutes providing patient education and counseling. Thank you for allowing me to participate in the care of Ms. Travis. For any further questions please do not hesitate to contact me. Note is dictated utilizing voice recognition software. Unfortunately this leads to occasional typographical errors. Please contact our office if you have any questions. I have reviewed and agree with the MA/ELLIEN ROS.      Lucia Parker MD, [address]  Board Certified in Gastroenterology and [address] [facility]  Office #: [phone]

## 2022-02-02 ENCOUNTER — TELEPHONE (OUTPATIENT)
Dept: OBGYN CLINIC | Age: 37
End: 2022-02-02

## 2022-02-02 NOTE — TELEPHONE ENCOUNTER
----- Message from BALDO Torres - CNP sent at 2/1/2022  5:39 PM EST -----  Simple right ovarian cyst but she had c/o chronic pelvic pain, according radiology does not need f/u US on ovarian cyst. I would say if persistent right sided pain can repeat in 3 months, or if pain worsens notify us but she also has PCS and I started her on OCP she needs to schedule follow up for 3 months from her pap in January, sooner if needed

## 2022-02-07 ENCOUNTER — HOSPITAL ENCOUNTER (OUTPATIENT)
Dept: PREADMISSION TESTING | Age: 37
Discharge: HOME | End: 2022-02-11

## 2022-02-07 VITALS — WEIGHT: 140 LBS | HEIGHT: 63 IN | BODY MASS INDEX: 24.8 KG/M2

## 2022-02-07 NOTE — PROGRESS NOTES

## 2022-02-11 ENCOUNTER — HOSPITAL ENCOUNTER (OUTPATIENT)
Dept: LAB | Age: 37
Setting detail: SPECIMEN
Discharge: HOME | End: 2022-02-11
Payer: MEDICARE

## 2022-02-11 PROCEDURE — U0003 INFECTIOUS AGENT DETECTION BY NUCLEIC ACID (DNA OR RNA); SEVERE ACUTE RESPIRATORY SYNDROME CORONAVIRUS 2 (SARS-COV-2) (CORONAVIRUS DISEASE [COVID-19]), AMPLIFIED PROBE TECHNIQUE, MAKING USE OF HIGH THROUGHPUT TECHNOLOGIES AS DESCRIBED BY CMS-2020-01-R: HCPCS

## 2022-02-11 PROCEDURE — U0005 INFEC AGEN DETEC AMPLI PROBE: HCPCS

## 2022-02-12 LAB
SARS-COV-2: NORMAL
SARS-COV-2: NOT DETECTED
SOURCE: NORMAL

## 2022-02-14 ENCOUNTER — ANESTHESIA EVENT (OUTPATIENT)
Dept: ENDOSCOPY | Age: 37
End: 2022-02-14
Payer: MEDICARE

## 2022-02-15 ENCOUNTER — HOSPITAL ENCOUNTER (OUTPATIENT)
Age: 37
Setting detail: OUTPATIENT SURGERY
Discharge: HOME | End: 2022-02-15
Attending: INTERNAL MEDICINE | Admitting: INTERNAL MEDICINE
Payer: MEDICARE

## 2022-02-15 ENCOUNTER — ANESTHESIA (OUTPATIENT)
Dept: ENDOSCOPY | Age: 37
End: 2022-02-15
Payer: MEDICARE

## 2022-02-15 VITALS
SYSTOLIC BLOOD PRESSURE: 116 MMHG | TEMPERATURE: 97.4 F | DIASTOLIC BLOOD PRESSURE: 82 MMHG | HEIGHT: 63 IN | OXYGEN SATURATION: 100 % | HEART RATE: 75 BPM | RESPIRATION RATE: 12 BRPM | BODY MASS INDEX: 24.8 KG/M2 | WEIGHT: 140 LBS

## 2022-02-15 VITALS
RESPIRATION RATE: 12 BRPM | OXYGEN SATURATION: 100 % | DIASTOLIC BLOOD PRESSURE: 54 MMHG | SYSTOLIC BLOOD PRESSURE: 103 MMHG

## 2022-02-15 LAB
-: NORMAL
HCG, PREGNANCY URINE (POC): NEGATIVE

## 2022-02-15 PROCEDURE — 88342 IMHCHEM/IMCYTCHM 1ST ANTB: CPT

## 2022-02-15 PROCEDURE — 7100000010 HC PHASE II RECOVERY - FIRST 15 MIN: Performed by: INTERNAL MEDICINE

## 2022-02-15 PROCEDURE — 3609012400 HC EGD TRANSORAL BIOPSY SINGLE/MULTIPLE: Performed by: INTERNAL MEDICINE

## 2022-02-15 PROCEDURE — 3700000001 HC ADD 15 MINUTES (ANESTHESIA): Performed by: INTERNAL MEDICINE

## 2022-02-15 PROCEDURE — 3700000000 HC ANESTHESIA ATTENDED CARE: Performed by: INTERNAL MEDICINE

## 2022-02-15 PROCEDURE — 43450 DILATE ESOPHAGUS 1/MULT PASS: CPT | Performed by: INTERNAL MEDICINE

## 2022-02-15 PROCEDURE — 2500000003 HC RX 250 WO HCPCS

## 2022-02-15 PROCEDURE — 2580000003 HC RX 258: Performed by: ANESTHESIOLOGY

## 2022-02-15 PROCEDURE — 81025 URINE PREGNANCY TEST: CPT

## 2022-02-15 PROCEDURE — 43239 EGD BIOPSY SINGLE/MULTIPLE: CPT | Performed by: INTERNAL MEDICINE

## 2022-02-15 PROCEDURE — 88305 TISSUE EXAM BY PATHOLOGIST: CPT

## 2022-02-15 PROCEDURE — 6360000002 HC RX W HCPCS

## 2022-02-15 PROCEDURE — 7100000011 HC PHASE II RECOVERY - ADDTL 15 MIN: Performed by: INTERNAL MEDICINE

## 2022-02-15 PROCEDURE — 2709999900 HC NON-CHARGEABLE SUPPLY: Performed by: INTERNAL MEDICINE

## 2022-02-15 RX ORDER — HYDRALAZINE HYDROCHLORIDE 20 MG/ML
5 INJECTION INTRAMUSCULAR; INTRAVENOUS EVERY 10 MIN PRN
Status: DISCONTINUED | OUTPATIENT
Start: 2022-02-15 | End: 2022-02-15 | Stop reason: HOSPADM

## 2022-02-15 RX ORDER — PROPOFOL 10 MG/ML
INJECTION, EMULSION INTRAVENOUS PRN
Status: DISCONTINUED | OUTPATIENT
Start: 2022-02-15 | End: 2022-02-15 | Stop reason: SDUPTHER

## 2022-02-15 RX ORDER — DIPHENHYDRAMINE HYDROCHLORIDE 50 MG/ML
12.5 INJECTION INTRAMUSCULAR; INTRAVENOUS
Status: DISCONTINUED | OUTPATIENT
Start: 2022-02-15 | End: 2022-02-15 | Stop reason: HOSPADM

## 2022-02-15 RX ORDER — LIDOCAINE HYDROCHLORIDE 10 MG/ML
1 INJECTION, SOLUTION EPIDURAL; INFILTRATION; INTRACAUDAL; PERINEURAL
Status: DISCONTINUED | OUTPATIENT
Start: 2022-02-15 | End: 2022-02-15 | Stop reason: HOSPADM

## 2022-02-15 RX ORDER — LABETALOL HYDROCHLORIDE 5 MG/ML
5 INJECTION, SOLUTION INTRAVENOUS EVERY 10 MIN PRN
Status: DISCONTINUED | OUTPATIENT
Start: 2022-02-15 | End: 2022-02-15 | Stop reason: HOSPADM

## 2022-02-15 RX ORDER — ONDANSETRON 2 MG/ML
4 INJECTION INTRAMUSCULAR; INTRAVENOUS
Status: DISCONTINUED | OUTPATIENT
Start: 2022-02-15 | End: 2022-02-15 | Stop reason: HOSPADM

## 2022-02-15 RX ORDER — SODIUM CHLORIDE, SODIUM LACTATE, POTASSIUM CHLORIDE, CALCIUM CHLORIDE 600; 310; 30; 20 MG/100ML; MG/100ML; MG/100ML; MG/100ML
INJECTION, SOLUTION INTRAVENOUS CONTINUOUS
Status: DISCONTINUED | OUTPATIENT
Start: 2022-02-15 | End: 2022-02-15 | Stop reason: HOSPADM

## 2022-02-15 RX ORDER — PROMETHAZINE HYDROCHLORIDE 25 MG/ML
6.25 INJECTION, SOLUTION INTRAMUSCULAR; INTRAVENOUS
Status: DISCONTINUED | OUTPATIENT
Start: 2022-02-15 | End: 2022-02-15 | Stop reason: HOSPADM

## 2022-02-15 RX ORDER — LIDOCAINE HYDROCHLORIDE 10 MG/ML
INJECTION, SOLUTION EPIDURAL; INFILTRATION; INTRACAUDAL; PERINEURAL PRN
Status: DISCONTINUED | OUTPATIENT
Start: 2022-02-15 | End: 2022-02-15 | Stop reason: SDUPTHER

## 2022-02-15 RX ORDER — 0.9 % SODIUM CHLORIDE 0.9 %
500 INTRAVENOUS SOLUTION INTRAVENOUS
Status: DISCONTINUED | OUTPATIENT
Start: 2022-02-15 | End: 2022-02-15 | Stop reason: HOSPADM

## 2022-02-15 RX ADMIN — SODIUM CHLORIDE, POTASSIUM CHLORIDE, SODIUM LACTATE AND CALCIUM CHLORIDE: 600; 310; 30; 20 INJECTION, SOLUTION INTRAVENOUS at 09:03

## 2022-02-15 RX ADMIN — LIDOCAINE HYDROCHLORIDE 40 MG: 10 INJECTION, SOLUTION EPIDURAL; INFILTRATION; INTRACAUDAL; PERINEURAL at 10:57

## 2022-02-15 RX ADMIN — PROPOFOL 230 MG: 10 INJECTION, EMULSION INTRAVENOUS at 10:57

## 2022-02-15 ASSESSMENT — ENCOUNTER SYMPTOMS
DIARRHEA: 0
ABDOMINAL PAIN: 1
NAUSEA: 1
CONSTIPATION: 0
VOMITING: 0
RESPIRATORY NEGATIVE: 1
EYES NEGATIVE: 1

## 2022-02-15 ASSESSMENT — PULMONARY FUNCTION TESTS
PIF_VALUE: 1
PIF_VALUE: 0
PIF_VALUE: 1
PIF_VALUE: 0
PIF_VALUE: 2
PIF_VALUE: 0
PIF_VALUE: 1

## 2022-02-15 ASSESSMENT — PAIN SCALES - GENERAL: PAINLEVEL_OUTOF10: 0

## 2022-02-15 ASSESSMENT — PAIN - FUNCTIONAL ASSESSMENT: PAIN_FUNCTIONAL_ASSESSMENT: 0-10

## 2022-02-15 NOTE — H&P
HISTORY and Lorrie Thom[facility] [id number]       NAME:  Channing Scales  MRN: [id number]   YOB: 1985   Date: 2/15/2022   Age: 39 y.o. Gender: female       COMPLAINT AND PRESENT HISTORY:                Channing Scales is 39 y.o.,  female, undergoing for EGD. Patient has had no  previous endoscopies done before. Pre diagnosis:CHRONIC GERD  HPI:  Patient denies any FH of Esophogeal Cancer. Pt has history of chronic heartburn started since she was a teenager but in the past year it getting worse. Currently she is on Prilosec and Pepcid with no pain relief. Patient complains of frequent heartburn, sometimes awakes one from sleep. Patient report some times she has hard time to swallow her food. She states   'the food is very slow to go down:   Patient has history of  Constant aching pain in the RUQ with nausea but no vomiting. The pain did not related to any kind of food or BM. Also she complain of abdominal bloating,  She lost 20 pound in the last 6 months. She went ER two weeks ago  And she had abdomen CT which it was within normal limits.     Pt denies fever/chills, chest pain or SOB     Review of additional significant medical hx:  Asthma  Currently medication r/t condition : albuterol sulfate  Anxiety:  Currently medication r/t condition : xanax   Mitral valve prolapse/ HTN  ( pt denies chest pain , palpitation dizziness, headache )  Currently medication r/t condition : hydrodiuril     ECG  Normal sinus rhythm  Normal ECG  When compared with ECG of 20-SEP-2006 12:52,  T wave inversion no longer evident in Inferior leads  Nonspecific T wave abnormality no longer evident in Anterolateral leads    BP Readings from Last 3 Encounters:   02/01/22 122/87   01/26/22 (!) 154/102   01/05/22 110/70       Lab Results   Component Value Date    WBC 5.2 01/26/2022    HGB 12.1 01/26/2022    HCT 35.7 (L) 01/26/2022    MCV 86.3 01/26/2022     01/26/2022     Lab Results   Component Value Date  01/26/2022    K 4.2 01/26/2022     01/26/2022    CO2 26 01/26/2022    BUN 10 01/26/2022    CREATININE 0.46 01/26/2022    GLUCOSE 95 01/26/2022    CALCIUM 9.6 01/26/2022        NPO status:  Pt NPO since the past midnight   Medications taken TODAY (with sip of water): pt took Neurontin   Anticoagulation status: none  Denies personal hx of blood clots. Denies personal hx of MRSA infection. Denies any personal or family hx of previous complications w/anesthesia.   She has POTS and PONV  PAST MEDICAL HISTORY     Past Medical History:   Diagnosis Date    Anxiety     Asthma     Bladder prolapse, female, acquired     HSV (herpes simplex virus) anogenital infection 12/26/2017    Mitral valve prolapse 2005    with regurg, has echo q 6 months, premed with dental visits    Ovary anomaly     reflux    Pelvic congestive syndrome     PONV (postoperative nausea and vomiting)     POTS (postural orthostatic tachycardia syndrome) 2012    Pulmonary hypertension ([facility]        SURGICAL HISTORY       Past Surgical History:   Procedure Laterality Date    BREAST BIOPSY      BREAST ENHANCEMENT SURGERY Bilateral 2008    removed in 2011   Matthew Don Bilateral 2011    COLPOSCOPY  06/20/2016    Dr. Angeyl Moeller Bilateral 2011    [facility]      [facility] at [facility]     WISDOM TOOTH EXTRACTION         FAMILY HISTORY       Family History   Problem Relation Age of Onset    Sudden Death Paternal Grandfather         suicide    Diabetes Maternal Grandmother     Heart Attack Maternal Grandmother     Hypertension Maternal Grandfather     Colon Cancer Maternal Grandfather     Diabetes Mother     Hypertension Mother     Osteoporosis Paternal Grandmother        SOCIAL HISTORY       Social History     Socioeconomic History    Marital status:      Spouse name: Not on file    Number of children: Not on file    Years of education: Not on file   [employer] Highest education level: Not on file   Occupational History    Not on file   Tobacco Use    Smoking status: Never Smoker    Smokeless tobacco: Never Used   Vaping Use    Vaping Use: Former    Substances: Never   Substance and Sexual Activity    Alcohol use: No    Drug use: Yes     Types: Marijuana Slayden Piper)    Sexual activity: Yes     Partners: Male   Other Topics Concern    Not on file   Social History Narrative    Not on file     Social Determinants of Health     Financial Resource Strain:     Difficulty of Paying Living Expenses: Not on file   Food Insecurity:     Worried About Running Out of Food in the Last Year: Not on file    Adriana of Food in the Last Year: Not on file   Transportation Needs:     Lack of Transportation (Medical): Not on file    Lack of Transportation (Non-Medical): Not on file   Physical Activity:     Days of Exercise per Week: Not on file    Minutes of Exercise per Session: Not on file   Stress:     Feeling of Stress : Not on file   Social Connections:     Frequency of Communication with Friends and Family: Not on file    Frequency of Social Gatherings with Friends and Family: Not on file    Attends Mu-ism Services: Not on file    Active Member of [address] [place] [address] or Organizations: Not on file    Attends Club or Organization Meetings: Not on file    Marital Status: Not on file   Intimate Partner Violence:     Fear of Current or Ex-Partner: Not on file    Emotionally Abused: Not on file    Physically Abused: Not on file    Sexually Abused: Not on file   Housing Stability:     Unable to Pay for Housing in the Last Year: Not on file    Number of [place] in the Last Year: Not on file    Unstable Housing in the Last Year: Not on file           REVIEW OF SYSTEMS      Allergies   Allergen Reactions    Latex     Codeine        No current facility-administered medications on file prior to encounter.      Current Outpatient Medications on File Prior to Encounter Medication Sig Dispense Refill    albuterol sulfate  (90 Base) MCG/ACT inhaler Inhale 2 puffs into the lungs every 6 hours as needed      Cholecalciferol 50 MCG (2000 UT) TABS Take 2,000 Units by mouth daily      Clobetasol Propionate 0.05 % SHAM Apply topically daily      gabapentin (NEURONTIN) 800 MG tablet Take 800 mg by mouth 2 times daily.  hydroCHLOROthiazide (HYDRODIURIL) 12.5 MG tablet Take 12.5 mg by mouth daily      omeprazole (PRILOSEC) 20 MG delayed release capsule Take 20 mg by mouth 2 times daily       FLUoxetine (PROZAC) 40 MG capsule Take 40 mg by mouth daily       ondansetron (ZOFRAN ODT) 4 MG disintegrating tablet Take 1 tablet by mouth every 8 hours as needed for Nausea 20 tablet 0    ALPRAZolam (XANAX) 0.25 MG tablet Take 0.25 mg by mouth 2 times daily.  butalbital-APAP-caffeine -40 MG CAPS per capsule Take 1 capsule by mouth every 4 hours as needed for Headaches      Drospirenone (SLYND) 4 MG TABS Take 1 tablet by mouth daily. 84 tablet 0    valACYclovir (VALTREX) 1 g tablet Take 1 tablet by mouth daily 30 tablet 0    gabapentin (NEURONTIN) 600 MG tablet Take 600 mg by mouth nightly. Review of Systems   Constitutional: Positive for fatigue and unexpected weight change. HENT: Negative. Eyes: Negative. Respiratory: Negative. Cardiovascular: Negative. Gastrointestinal: Positive for abdominal pain and nausea. Negative for constipation, diarrhea and vomiting. Genitourinary: Positive for flank pain, frequency and urgency. Skin: Negative. Neurological: Negative. Hematological: Negative. Psychiatric/Behavioral: Positive for sleep disturbance. GENERAL PHYSICAL EXAM     Vitals: see nursing flow sheet for vital signs     GENERAL APPEARANCE:   Jannelle Angelucci is 39 y.o.,  female, , nourished, conscious, alert. Does not appear to be distress or pain at this time.                             Physical Exam  Constitutional: Appearance: Normal appearance. She is normal weight. HENT:      Head: Normocephalic. Right Ear: External ear normal.      Left Ear: External ear normal.      Nose: Nose normal.      Mouth/Throat:      Mouth: Mucous membranes are moist.   Eyes:      General:         Right eye: No discharge. Left eye: No discharge. Cardiovascular:      Rate and Rhythm: Normal rate and regular rhythm. Pulses: Normal pulses. Radial pulses are 2+ on the right side and 2+ on the left side. Dorsalis pedis pulses are 2+ on the right side and 2+ on the left side. Posterior tibial pulses are 2+ on the right side and 2+ on the left side. Heart sounds: Normal heart sounds. No murmur heard. No gallop. Pulmonary:      Effort: Pulmonary effort is normal. No respiratory distress. Breath sounds: Normal breath sounds. No wheezing or rhonchi. Abdominal:      General: Bowel sounds are normal. There is no distension. Palpations: Abdomen is soft. There is no mass. Musculoskeletal:         General: Normal range of motion. Cervical back: Normal range of motion and neck supple. Right lower leg: No edema. Left lower leg: No edema. Skin:     General: Skin is warm and dry. Findings: No bruising or erythema. Neurological:      General: No focal deficit present. Mental Status: She is alert and oriented to person, place, and time. Mental status is at baseline.       Gait: Gait normal.   Psychiatric:         Mood and Affect: Mood normal.         Behavior: Behavior normal.                   PROVISIONAL DIAGNOSES / SURGERY:    CHRONIC GERD  EGD  Patient Active Problem List    Diagnosis Date Noted    Nonrheumatic mitral (valve) insufficiency 08/15/2018    Nonrheumatic tricuspid valve regurgitation 08/15/2018    Palpitations 08/15/2018    Herpes simplex vulvovaginitis 07/14/2017    [facility] of [facility] 07/14/2017    Pulmonary hypertension ([facility] 2017    Chronic nonintractable headache 2017    ASCUS with positive high risk HPV cervical 2016     14.  F Apg 8/ Wt 8#15 2014    Benzodiazepine abuse ([facility] 2013    Anxiety 2012    Asthma 2012           BALDO Leary - CNP on 2/15/2022 at 7:45 AM

## 2022-02-15 NOTE — OP NOTE
noted.  Multiple biopsies taken from the body and antrum evaluate H. pylori  Duodenum:     Descending: normal    Bulb: normal    Given patient's symptoms of dysphagia, I did dilate the esophagus using 54 [address] dilator. Dilation was carried out in the usual fashion. Following the dilatation I did check the esophagus with the gastroscope and no mucosal stretch marks seen. Multiple random biopsies taken from the esophagus to check for eosinophilic esophagitis          While withdrawing the scope the above findings were verified and the scope was removed. The patient has tolerated the procedure without unusual events. Recommendations/Plan:   1. F/U Biopsies  2. F/U In Office as instructed  3.  Discussed with the family                   Electronically signed by Eleni Vasquez MD  on 2/15/2022 at 11:06 AM

## 2022-02-15 NOTE — ANESTHESIA POSTPROCEDURE EVALUATION
Department of Anesthesiology  Postprocedure Note    Patient: Sulma Rodriguez  MRN: [id number]  YOB: 1985  Date of evaluation: 2/15/2022  Time:  12:57 PM     Procedure Summary     Date: 02/15/22 Room / Location: [address] [place] [address] ENDO 01 / [facility] ENDO    Anesthesia Start: 4899 Anesthesia Stop: 1110    Procedure: EGD ESOPHAGOGASTRODUODENOSCOPY DILATATION AND STOMACH BIOPSY AND ESOPHAGUS (N/A Esophagus) Diagnosis:       (CHRONIC GERD)      (COVID 2/11)    Surgeons: Rahul Leo MD Responsible Provider: Lizbeth Greenberg MD    Anesthesia Type: general ASA Status: 2          Anesthesia Type: general    Laurie Phase I:      Laurie Phase II: Laurie Score: 10    Last vitals: Reviewed and per EMR flowsheets.        Anesthesia Post Evaluation    Comments: POST- ANESTHESIA EVALUATION       Pt Name: Sulma Rodriguez  MRN: [id number]  [place]: 1985  Date of evaluation: 2/15/2022  Time:  12:57 PM      /82   Pulse 75   Temp 97.4 °F (36.3 °C)   Resp 12   Ht 5' 2.5\" (1.588 m)   Wt 140 lb (63.5 kg)   SpO2 100%   BMI 25.20 kg/m²      Consciousness Level  Awake  Cardiopulmonary Status  Stable  Pain Adequately Treated YES  Nausea / Vomiting  NO  Adequate Hydration  YES  Anesthesia Related Complications NONE      Electronically signed by Lizbeth Greenberg MD on 2/15/2022 at 12:57 PM

## 2022-02-15 NOTE — ANESTHESIA PRE PROCEDURE
Department of Anesthesiology  Preprocedure Note       Name:  Nabeel Singh   Age:  39 y.o.  :  1985                                          MRN:  [id number]         Date:  2/15/2022      Surgeon: Jose Tapia):  Annabel Leal MD    Procedure: Procedure(s):  EGD    Medications prior to admission:   Prior to Admission medications    Medication Sig Start Date End Date Taking? Authorizing Provider   albuterol sulfate  (90 Base) MCG/ACT inhaler Inhale 2 puffs into the lungs every 6 hours as needed 22  Yes Historical Provider, MD   Cholecalciferol 50 MCG (2000 UT) TABS Take 2,000 Units by mouth daily 21  Yes Historical Provider, MD   Clobetasol Propionate 0.05 % SHAM Apply topically daily 21  Yes Historical Provider, MD   gabapentin (NEURONTIN) 800 MG tablet Take 800 mg by mouth 2 times daily. 22  Yes Historical Provider, MD   hydroCHLOROthiazide (HYDRODIURIL) 12.5 MG tablet Take 12.5 mg by mouth daily 22  Yes Historical Provider, MD   omeprazole (PRILOSEC) 20 MG delayed release capsule Take 20 mg by mouth 2 times daily  22  Yes Historical Provider, MD   FLUoxetine (PROZAC) 40 MG capsule Take 40 mg by mouth daily  22  Yes Historical Provider, MD   ondansetron (ZOFRAN ODT) 4 MG disintegrating tablet Take 1 tablet by mouth every 8 hours as needed for Nausea 22  Yes Brandon Armstrong DO   ALPRAZolam (XANAX) 0.25 MG tablet Take 0.25 mg by mouth 2 times daily. Yes Historical Provider, MD   Drospirenone (SLYND) 4 MG TABS Take 1 tablet by mouth daily. 22  Yes BALDO Gama - CNP   valACYclovir (VALTREX) 1 g tablet Take 1 tablet by mouth daily 10/4/21  Yes BALDO Gama - CNP   gabapentin (NEURONTIN) 600 MG tablet Take 600 mg by mouth nightly.     Yes Historical Provider, MD   butalbital-APAP-caffeine -40 MG CAPS per capsule Take 1 capsule by mouth every 4 hours as needed for Headaches    Historical Provider, MD       Current medications: Current Facility-Administered Medications   Medication Dose Route Frequency Provider Last Rate Last Admin    lactated ringers infusion   IntraVENous Continuous Erasmo Au  mL/hr at 02/15/22 0903 New Bag at 02/15/22 0903    lidocaine PF 1 % injection 1 mL  1 mL IntraDERmal Once PRN Erasmo Au MD           Allergies: Allergies   Allergen Reactions    Latex Swelling    Codeine Itching       Problem List:    Patient Active Problem List   Diagnosis Code    Anxiety F41.9    Asthma J45.909    Benzodiazepine abuse ([facility] F13.10     14.  F Apg 8/ Wt 8#15 Z39.2    ASCUS with positive high risk HPV cervical R87.610, R87.810    Chronic nonintractable headache R51.9, G89.29    Herpes simplex vulvovaginitis A60.04    Moderate episode of recurrent major depressive disorder ([facility] F33.1    Nonrheumatic mitral (valve) insufficiency I34.0    Nonrheumatic tricuspid valve regurgitation I36.1    Palpitations R00.2    Pulmonary hypertension (HCC) I27.20       Past Medical History:        Diagnosis Date    Anxiety     Asthma     Bladder prolapse, female, acquired     HSV (herpes simplex virus) anogenital infection 2017    Mitral valve prolapse 2005    with regurg, has echo q 6 months, premed with dental visits    Ovary anomaly     reflux    Pelvic congestive syndrome     PONV (postoperative nausea and vomiting)     POTS (postural orthostatic tachycardia syndrome)     Pulmonary hypertension ([facility]        Past Surgical History:        Procedure Laterality Date    BREAST BIOPSY      BREAST ENHANCEMENT SURGERY Bilateral     removed in    Matthew Don Bilateral 2011    COLPOSCOPY  2016    Dr. Nahun Hartley Bilateral 2011    [facility]      [facility]     WISDOM TOOTH EXTRACTION         Social History:    Social History     Tobacco Use    Smoking status: Never Smoker    Smokeless tobacco: Never Used Substance Use Topics    Alcohol use: No                                Counseling given: Not Answered      Vital Signs (Current):   Vitals:    02/15/22 0845   BP: 107/62   Pulse: 83   Resp: 16   Temp: 97.1 °F (36.2 °C)   TempSrc: Infrared   SpO2: 98%   Weight: 140 lb (63.5 kg)   Height: 5' 2.5\" (1.588 m)                                              BP Readings from Last 3 Encounters:   02/15/22 107/62   02/01/22 122/87   01/26/22 (!) 154/102       NPO Status: Time of last liquid consumption: 1900                        Time of last solid consumption: 1900                        Date of last liquid consumption: 02/14/22                        Date of last solid food consumption: 02/14/22    BMI:   Wt Readings from Last 3 Encounters:   02/15/22 140 lb (63.5 kg)   02/07/22 140 lb (63.5 kg)   02/01/22 149 lb 1.6 oz (67.6 kg)     Body mass index is 25.2 kg/m². CBC:   Lab Results   Component Value Date    WBC 5.2 01/26/2022    RBC 4.13 01/26/2022    HGB 12.1 01/26/2022    HCT 35.7 01/26/2022    MCV 86.3 01/26/2022    RDW 13.8 01/26/2022     01/26/2022       CMP:   Lab Results   Component Value Date     01/26/2022    K 4.2 01/26/2022     01/26/2022    CO2 26 01/26/2022    BUN 10 01/26/2022    CREATININE 0.46 01/26/2022    GFRAA >60 01/26/2022    LABGLOM >60 01/26/2022    GLUCOSE 95 01/26/2022    PROT 7.3 01/26/2022    CALCIUM 9.6 01/26/2022    BILITOT 0.21 01/26/2022    ALKPHOS 58 01/26/2022    AST 16 01/26/2022    ALT 18 01/26/2022       POC Tests: No results for input(s): POCGLU, POCNA, POCK, POCCL, POCBUN, POCHEMO, POCHCT in the last 72 hours.     Coags: No results found for: PROTIME, INR, APTT    HCG (If Applicable):   Lab Results   Component Value Date    PREGTESTUR NEGATIVE 01/26/2022    HCGQUANT 99,213 (H) 11/25/2013        ABGs: No results found for: PHART, PO2ART, EFJ1NCA, CDC7WFL, BEART, V4GITLZE     Type & Screen (If Applicable):  No results found for: LABABO, LABRH    Drug/Infectious Status (If Applicable):  No results found for: HIV, HEPCAB    COVID-19 Screening (If Applicable):   Lab Results   Component Value Date    COVID19 Not Detected 02/11/2022           Anesthesia Evaluation  Patient summary reviewed and Nursing notes reviewed   history of anesthetic complications: PONV. Airway: Mallampati: II  TM distance: >3 FB   Neck ROM: full  Mouth opening: > = 3 FB Dental: normal exam         Pulmonary:normal exam  breath sounds clear to auscultation  (+) asthma:                            Cardiovascular:Negative CV ROS            Rhythm: regular  Rate: normal                    Neuro/Psych:   (+) headaches:, psychiatric history:            GI/Hepatic/Renal:   (+) GERD:,           Endo/Other: Negative Endo/Other ROS                    Abdominal:             Vascular: Other Findings:             Anesthesia Plan      general     ASA 2       Induction: intravenous. MIPS: Prophylactic antiemetics administered. Anesthetic plan and risks discussed with patient. Plan discussed with CRNA.                   Odessa Guy MD   2/15/2022

## 2022-02-16 ENCOUNTER — PATIENT MESSAGE (OUTPATIENT)
Dept: GASTROENTEROLOGY | Age: 37
End: 2022-02-16

## 2022-02-17 LAB — SURGICAL PATHOLOGY REPORT: NORMAL

## 2022-02-17 NOTE — TELEPHONE ENCOUNTER
From: Luci Minor  To: Dr. Sherif Stevenson: 2/16/2022 9:04 AM EST  Subject: Egd pain    Hello. I had an egd done yesterday, and today I'm having that weird pain when I swallow in my chest. Is this ok?

## 2022-02-17 NOTE — TELEPHONE ENCOUNTER
Dr. Josue Anderson called the patient himself and left a message and advised the patient to take Maalox for 2 to 3 days. He also stated to call the office if she had and other questions or concerns.

## 2022-02-18 ENCOUNTER — PATIENT MESSAGE (OUTPATIENT)
Dept: OBGYN CLINIC | Age: 37
End: 2022-02-18

## 2022-02-18 RX ORDER — FLUCONAZOLE 150 MG/1
150 TABLET ORAL ONCE
Qty: 1 TABLET | Refills: 0 | Status: SHIPPED | OUTPATIENT
Start: 2022-02-18 | End: 2022-02-18

## 2022-02-18 NOTE — TELEPHONE ENCOUNTER
From: Sulma Rodriguez  To: Mercy Martinez  Sent: 2/18/2022 11:35 AM EST  Subject: Yeast infection     I'm sorry to bother you, but I have another yeast infection from antibiotics. Can your office please call meds in for me. Sadly everytime I take any antibiotics I get a yeast infection.

## 2022-02-22 RX ORDER — FLUCONAZOLE 150 MG/1
150 TABLET ORAL ONCE
Qty: 1 TABLET | Refills: 0 | Status: SHIPPED | OUTPATIENT
Start: 2022-02-22 | End: 2022-02-22

## 2022-02-22 NOTE — TELEPHONE ENCOUNTER
Dru perez. Sadly I think I threw my prescription away on accident. I picked it up from [facility] with other meds (not realizing it was filled). I must have overlooked it and tossed it!  Is there anything I can do to have it called again??? I'm panicking

## 2022-02-23 ENCOUNTER — TELEPHONE (OUTPATIENT)
Dept: GASTROENTEROLOGY | Age: 37
End: 2022-02-23

## 2022-02-23 NOTE — TELEPHONE ENCOUNTER
LVM for pt to return call to reschedule EGD f/u for 2 - 3 weeks. Pt scheduled her f/u at [facility] in May. In the vm writer adv going forward to schedule appts by calling office due to [employer] not displaying the locations and adv that earlier appts are available.

## 2022-03-09 ENCOUNTER — PATIENT MESSAGE (OUTPATIENT)
Dept: OBGYN CLINIC | Age: 37
End: 2022-03-09

## 2022-03-09 RX ORDER — DROSPIRENONE 4 MG/1
TABLET, FILM COATED ORAL
Qty: 84 TABLET | Refills: 0 | Status: SHIPPED | OUTPATIENT
Start: 2022-03-09 | End: 2022-05-31

## 2022-03-09 NOTE — TELEPHONE ENCOUNTER
From: Triny Rainey  To: Shannon Billing  Sent: 3/9/2022 8:14 AM EST  Subject: Birth control pills    My medication was sent to a pharmacy that mails medication. Can it please be sent to [facility] on [facility] instead?

## 2022-03-14 ENCOUNTER — OFFICE VISIT (OUTPATIENT)
Dept: FAMILY MEDICINE CLINIC | Age: 37
End: 2022-03-14
Payer: MEDICARE

## 2022-03-14 VITALS
OXYGEN SATURATION: 99 % | DIASTOLIC BLOOD PRESSURE: 60 MMHG | HEART RATE: 89 BPM | TEMPERATURE: 97.3 F | SYSTOLIC BLOOD PRESSURE: 105 MMHG

## 2022-03-14 DIAGNOSIS — J02.9 SORE THROAT: ICD-10-CM

## 2022-03-14 DIAGNOSIS — B37.9 ANTIBIOTIC-INDUCED YEAST INFECTION: ICD-10-CM

## 2022-03-14 DIAGNOSIS — R68.89 FLU-LIKE SYMPTOMS: ICD-10-CM

## 2022-03-14 DIAGNOSIS — H66.003 NON-RECURRENT ACUTE SUPPURATIVE OTITIS MEDIA OF BOTH EARS WITHOUT SPONTANEOUS RUPTURE OF TYMPANIC MEMBRANES: Primary | ICD-10-CM

## 2022-03-14 DIAGNOSIS — T36.95XA ANTIBIOTIC-INDUCED YEAST INFECTION: ICD-10-CM

## 2022-03-14 LAB
INFLUENZA A ANTIBODY: NEGATIVE
INFLUENZA B ANTIBODY: NEGATIVE
S PYO AG THROAT QL: NORMAL

## 2022-03-14 PROCEDURE — G8484 FLU IMMUNIZE NO ADMIN: HCPCS | Performed by: NURSE PRACTITIONER

## 2022-03-14 PROCEDURE — 87880 STREP A ASSAY W/OPTIC: CPT | Performed by: NURSE PRACTITIONER

## 2022-03-14 PROCEDURE — G8427 DOCREV CUR MEDS BY ELIG CLIN: HCPCS | Performed by: NURSE PRACTITIONER

## 2022-03-14 PROCEDURE — G8419 CALC BMI OUT NRM PARAM NOF/U: HCPCS | Performed by: NURSE PRACTITIONER

## 2022-03-14 PROCEDURE — 87804 INFLUENZA ASSAY W/OPTIC: CPT | Performed by: NURSE PRACTITIONER

## 2022-03-14 PROCEDURE — 1036F TOBACCO NON-USER: CPT | Performed by: NURSE PRACTITIONER

## 2022-03-14 PROCEDURE — 99214 OFFICE O/P EST MOD 30 MIN: CPT | Performed by: NURSE PRACTITIONER

## 2022-03-14 RX ORDER — FLUTICASONE PROPIONATE 50 MCG
2 SPRAY, SUSPENSION (ML) NASAL DAILY
Qty: 16 G | Refills: 0 | Status: SHIPPED | OUTPATIENT
Start: 2022-03-14 | End: 2022-11-01

## 2022-03-14 RX ORDER — IBUPROFEN 800 MG/1
800 TABLET ORAL EVERY 6 HOURS PRN
COMMUNITY

## 2022-03-14 RX ORDER — BENZONATATE 100 MG/1
100 CAPSULE ORAL 3 TIMES DAILY PRN
Qty: 21 CAPSULE | Refills: 0 | Status: SHIPPED | OUTPATIENT
Start: 2022-03-14 | End: 2022-03-21

## 2022-03-14 RX ORDER — ONDANSETRON 4 MG/1
4 TABLET, ORALLY DISINTEGRATING ORAL EVERY 6 HOURS PRN
Qty: 30 TABLET | Refills: 0 | Status: SHIPPED | OUTPATIENT
Start: 2022-03-14 | End: 2022-06-30

## 2022-03-14 RX ORDER — FLUCONAZOLE 150 MG/1
TABLET ORAL
Qty: 2 TABLET | Refills: 0 | Status: SHIPPED | OUTPATIENT
Start: 2022-03-14 | End: 2022-08-02

## 2022-03-14 RX ORDER — AMOXICILLIN AND CLAVULANATE POTASSIUM 875; 125 MG/1; MG/1
1 TABLET, FILM COATED ORAL 2 TIMES DAILY
Qty: 20 TABLET | Refills: 0 | Status: SHIPPED | OUTPATIENT
Start: 2022-03-14 | End: 2022-03-24

## 2022-03-14 ASSESSMENT — PATIENT HEALTH QUESTIONNAIRE - PHQ9
SUM OF ALL RESPONSES TO PHQ QUESTIONS 1-9: 0
6. FEELING BAD ABOUT YOURSELF - OR THAT YOU ARE A FAILURE OR HAVE LET YOURSELF OR YOUR FAMILY DOWN: 0
2. FEELING DOWN, DEPRESSED OR HOPELESS: 0
9. THOUGHTS THAT YOU WOULD BE BETTER OFF DEAD, OR OF HURTING YOURSELF: 0
SUM OF ALL RESPONSES TO PHQ QUESTIONS 1-9: 0
1. LITTLE INTEREST OR PLEASURE IN DOING THINGS: 0
SUM OF ALL RESPONSES TO PHQ9 QUESTIONS 1 & 2: 0
5. POOR APPETITE OR OVEREATING: 0
3. TROUBLE FALLING OR STAYING ASLEEP: 0
SUM OF ALL RESPONSES TO PHQ QUESTIONS 1-9: 0
SUM OF ALL RESPONSES TO PHQ QUESTIONS 1-9: 0
10. IF YOU CHECKED OFF ANY PROBLEMS, HOW DIFFICULT HAVE THESE PROBLEMS MADE IT FOR YOU TO DO YOUR WORK, TAKE CARE OF THINGS AT HOME, OR GET ALONG WITH OTHER PEOPLE: 0
4. FEELING TIRED OR HAVING LITTLE ENERGY: 0
7. TROUBLE CONCENTRATING ON THINGS, SUCH AS READING THE NEWSPAPER OR WATCHING TELEVISION: 0
8. MOVING OR SPEAKING SO SLOWLY THAT OTHER PEOPLE COULD HAVE NOTICED. OR THE OPPOSITE, BEING SO FIGETY OR RESTLESS THAT YOU HAVE BEEN MOVING AROUND A LOT MORE THAN USUAL: 0

## 2022-03-14 ASSESSMENT — ENCOUNTER SYMPTOMS
VOMITING: 0
VISUAL CHANGE: 0
ABDOMINAL PAIN: 0
COUGH: 1
SWOLLEN GLANDS: 0
CHANGE IN BOWEL HABIT: 1
NAUSEA: 1
SORE THROAT: 1

## 2022-03-14 NOTE — PATIENT INSTRUCTIONS
Follow up Family Doctor in 2 weeks for ear recheck  Return worse, new symptoms develop, symptoms persist or have any questions or concerns  If over 6 months old, then may alternate Tylenol/Motrin every 3 hours as needed for pain or fever - take per package instructions  If under 6 months old, do not use Motrin (Ibuprofen), use Tylenol only, Take per package instructions  Cool mist humidifier bedside  Patient Education        Ear Infection (Otitis Media): Care Instructions  Overview     An ear infection may start with a cold and affect the middle ear (otitis media). It can hurt a lot. Most ear infections clear up on their own in a couple of days and do not need antibiotics. Also, antibiotics do not work against viruses, which may be the cause of your infection. Regular doses of pain relievers are the best way to reduce your fever and help you feel better. Follow-up care is a key part of your treatment and safety. Be sure to make and go to all appointments, and call your doctor if you are having problems. It's also a good idea to know your test results and keep a list of the medicines you take. How can you care for yourself at home? · Take pain medicines exactly as directed. ? If the doctor gave you a prescription medicine for pain, take it as prescribed. ? If you are not taking a prescription pain medicine, take an over-the-counter medicine, such as acetaminophen (Tylenol), ibuprofen (Advil, Motrin), or naproxen (Aleve). Read and follow all instructions on the label. ? Do not take two or more pain medicines at the same time unless the doctor told you to. Many pain medicines have acetaminophen, which is Tylenol. Too much acetaminophen (Tylenol) can be harmful. · Plan to take a full dose of pain reliever before bedtime. Getting enough sleep will help you get better. · Try a warm, moist washcloth on the ear. It may help relieve pain. · If your doctor prescribed antibiotics, take them as directed.  Do not stop taking them just because you feel better. You need to take the full course of antibiotics. When should you call for help? Call your doctor now or seek immediate medical care if:    · You have new or increasing ear pain.     · You have new or increasing pus or blood draining from your ear.     · You have a fever with a stiff neck or a severe headache. Watch closely for changes in your health, and be sure to contact your doctor if:    · You have new or worse symptoms.     · You are not getting better after taking an antibiotic for 2 days. Where can you learn more? Go to https://Urakkamaailma.fipeFlatter World.Floop. org and sign in to your [employer] account. Enter G970 in the [employer] box to learn more about \"Ear Infection (Otitis Media): Care Instructions. \"     If you do not have an account, please click on the \"Sign Up Now\" link. Current as of: September 8, 2021               Content Version: 13.1  © 2006-2021 Healthwise, Incorporated. Care instructions adapted under license by [facility] ([facility]). If you have questions about a medical condition or this instruction, always ask your healthcare professional. [place] [address] any warranty or liability for your use of this information.

## 2022-03-14 NOTE — PROGRESS NOTES
[facility]  [address] [address]  Dept: [phone]  Dept Fax: [phone]    Nabeel Singh is a 39 y.o. female who presents to the urgent care today for her medical conditions/complaints as notedbelow. Nabeel Singh is c/o of Cough (pt stated that she had covid in January, and sx started about 3 days ago. cough is productive ), Otalgia (bilat. ), and Pharyngitis      HPI:     39 yr old female presents for cough, nasal congestion, st and jose r ear pain  Hx covid + <90 days ago. Hx fevers 102.3 for last cpl days  Flu vaccine   Covid Vaccinated? 1 dose  Sx for 3 days      Pharyngitis  This is a new problem. The current episode started in the past 7 days (x3d). The problem occurs constantly. The problem has been waxing and waning. Associated symptoms include anorexia, a change in bowel habit, chills, congestion, coughing, fatigue, a fever, headaches, myalgias, nausea and a sore throat. Pertinent negatives include no abdominal pain, arthralgias, chest pain, diaphoresis, joint swelling, neck pain, numbness, rash, swollen glands, urinary symptoms, vertigo, visual change, vomiting or weakness. Nothing aggravates the symptoms. She has tried NSAIDs for the symptoms. The treatment provided mild relief.        Past Medical History:   Diagnosis Date    Anxiety     Asthma     Bladder prolapse, female, acquired     HSV (herpes simplex virus) anogenital infection 12/26/2017    Mitral valve prolapse 2005    with regurg, has echo q 6 months, premed with dental visits    Ovary anomaly     reflux    Pelvic congestive syndrome     PONV (postoperative nausea and vomiting)     POTS (postural orthostatic tachycardia syndrome) 2012    Pulmonary hypertension (HCC)         Current Outpatient Medications   Medication Sig Dispense Refill    ibuprofen (ADVIL;MOTRIN) 800 MG tablet Take 800 mg by mouth every 6 hours as needed for Pain      amoxicillin-clavulanate (AUGMENTIN) 875-125 MG per tablet Take 1 tablet by mouth 2 times daily for 10 days 20 tablet 0    fluconazole (DIFLUCAN) 150 MG tablet Take 1 tablet 2 days after start antibiotic then take last tablet at end of antibiotic 2 tablet 0    fluticasone (FLONASE) 50 MCG/ACT nasal spray 2 sprays by Nasal route daily 16 g 0    ondansetron (ZOFRAN ODT) 4 MG disintegrating tablet Take 1 tablet by mouth every 6 hours as needed for Nausea or Vomiting 30 tablet 0    benzonatate (TESSALON PERLES) 100 MG capsule Take 1 capsule by mouth 3 times daily as needed for Cough 21 capsule 0    Drospirenone (SLYND) 4 MG TABS Take 1 tablet by mouth daily. 84 tablet 0    albuterol sulfate  (90 Base) MCG/ACT inhaler Inhale 2 puffs into the lungs every 6 hours as needed      Cholecalciferol 50 MCG (2000 UT) TABS Take 2,000 Units by mouth daily      Clobetasol Propionate 0.05 % SHAM Apply topically daily      gabapentin (NEURONTIN) 800 MG tablet Take 800 mg by mouth 2 times daily.  hydroCHLOROthiazide (HYDRODIURIL) 12.5 MG tablet Take 12.5 mg by mouth daily      omeprazole (PRILOSEC) 20 MG delayed release capsule Take 20 mg by mouth 2 times daily       FLUoxetine (PROZAC) 40 MG capsule Take 40 mg by mouth daily       ALPRAZolam (XANAX) 0.25 MG tablet Take 0.25 mg by mouth 2 times daily.  butalbital-APAP-caffeine -40 MG CAPS per capsule Take 1 capsule by mouth every 4 hours as needed for Headaches      valACYclovir (VALTREX) 1 g tablet Take 1 tablet by mouth daily 30 tablet 0    gabapentin (NEURONTIN) 600 MG tablet Take 600 mg by mouth nightly.  ondansetron (ZOFRAN ODT) 4 MG disintegrating tablet Take 1 tablet by mouth every 8 hours as needed for Nausea (Patient not taking: Reported on 3/14/2022) 20 tablet 0     No current facility-administered medications for this visit.      Allergies   Allergen Reactions    Latex Swelling    Codeine Itching Subjective:      Review of Systems   Constitutional: Positive for chills, fatigue and fever. Negative for diaphoresis. HENT: Positive for congestion and sore throat. Respiratory: Positive for cough. Cardiovascular: Negative for chest pain. Gastrointestinal: Positive for anorexia, change in bowel habit and nausea. Negative for abdominal pain and vomiting. Musculoskeletal: Positive for myalgias. Negative for arthralgias, joint swelling and neck pain. Skin: Negative for rash. Neurological: Positive for headaches. Negative for vertigo, weakness and numbness. All other systems reviewed and are negative. 14 systems reviewed and negative except as listed in HPI. Objective:     Physical Exam  Vitals and nursing note reviewed. Constitutional:       General: She is not in acute distress. Appearance: Normal appearance. She is well-developed. She is ill-appearing. She is not toxic-appearing or diaphoretic. Comments: nontoxic   HENT:      Head: Normocephalic and atraumatic. Right Ear: Ear canal and external ear normal.      Left Ear: Ear canal and external ear normal.      Ears:      Comments: jose r tm bulging and injected     Nose: Congestion (thick yellow) present. Mouth/Throat:      Mouth: Mucous membranes are moist.      Pharynx: Posterior oropharyngeal erythema present. No oropharyngeal exudate. Comments: + yellow PND  Swallows without difficulty  Eyes:      General: No scleral icterus. Right eye: No discharge. Left eye: No discharge. Extraocular Movements: Extraocular movements intact. Conjunctiva/sclera: Conjunctivae normal.      Pupils: Pupils are equal, round, and reactive to light. Cardiovascular:      Rate and Rhythm: Normal rate and regular rhythm. Pulses: Normal pulses. Heart sounds: Normal heart sounds. No murmur heard. No friction rub. No gallop.     Pulmonary:      Effort: Pulmonary effort is normal. No respiratory distress. Breath sounds: Normal breath sounds. No stridor. No wheezing, rhonchi or rales. Chest:      Chest wall: No tenderness. Abdominal:      General: Bowel sounds are normal. There is no distension. Palpations: Abdomen is soft. Tenderness: There is no abdominal tenderness. Musculoskeletal:         General: No tenderness or deformity. Normal range of motion. Cervical back: Normal range of motion and neck supple. Lymphadenopathy:      Cervical: No cervical adenopathy. Skin:     General: Skin is warm and dry. Capillary Refill: Capillary refill takes less than 2 seconds. Findings: No rash ( no rash to visible skin). Neurological:      General: No focal deficit present. Mental Status: She is alert and oriented to person, place, and time. Motor: No abnormal muscle tone. Coordination: Coordination normal.   Psychiatric:         Mood and Affect: Mood normal.         Behavior: Behavior normal.       /60   Pulse 89   Temp 97.3 °F (36.3 °C)   SpO2 99%     Assessment:       Diagnosis Orders   1. Non-recurrent acute suppurative otitis media of both ears without spontaneous rupture of tympanic membranes     2. Flu-like symptoms  POCT Influenza A/B   3. Sore throat  POCT rapid strep A   4. Antibiotic-induced yeast infection         Plan:      Results for POC orders placed in visit on 03/14/22   POCT Influenza A/B   Result Value Ref Range    Influenza A Ab negative     Influenza B Ab negative    POCT rapid strep A   Result Value Ref Range    Strep A Ag None Detected None Detected         hx covid within last 90 days   POCT strep neg  POCT flu a and b neg  tx Om  Aug rx  Diflucan rx for hx antibiotic induced yeast infections  flonase rx for pnd, congestion  zofran for nausea  Tessalon perles for cough  Return for make appt with Family Doc in 2 weeks for ear check.     Orders Placed This Encounter   Medications    amoxicillin-clavulanate (AUGMENTIN) 875-125 MG per tablet     Sig: Take 1 tablet by mouth 2 times daily for 10 days     Dispense:  20 tablet     Refill:  0    fluconazole (DIFLUCAN) 150 MG tablet     Sig: Take 1 tablet 2 days after start antibiotic then take last tablet at end of antibiotic     Dispense:  2 tablet     Refill:  0    fluticasone (FLONASE) 50 MCG/ACT nasal spray     Si sprays by Nasal route daily     Dispense:  16 g     Refill:  0    ondansetron (ZOFRAN ODT) 4 MG disintegrating tablet     Sig: Take 1 tablet by mouth every 6 hours as needed for Nausea or Vomiting     Dispense:  30 tablet     Refill:  0    benzonatate (TESSALON PERLES) 100 MG capsule     Sig: Take 1 capsule by mouth 3 times daily as needed for Cough     Dispense:  21 capsule     Refill:  0         Patient given educational materials - see patient instructions. Discussed use, benefit, and side effects of prescribed medications. All patient questions answered. Pt voicedunderstanding.     Electronically signed by Mikeal Lesch, APRN - CNP on 3/14/2022 at 1:00 PM

## 2022-03-29 DIAGNOSIS — G89.29 OTHER CHRONIC PAIN: Primary | ICD-10-CM

## 2022-04-04 ENCOUNTER — HOSPITAL ENCOUNTER (OUTPATIENT)
Dept: SLEEP CENTER | Age: 37
Discharge: HOME | End: 2022-04-06
Payer: MEDICARE

## 2022-04-04 DIAGNOSIS — G47.33 OSA (OBSTRUCTIVE SLEEP APNEA): Primary | ICD-10-CM

## 2022-04-04 PROCEDURE — 95810 POLYSOM 6/> YRS 4/> PARAM: CPT

## 2022-04-05 VITALS
HEIGHT: 63 IN | BODY MASS INDEX: 26.4 KG/M2 | OXYGEN SATURATION: 98 % | WEIGHT: 149 LBS | RESPIRATION RATE: 16 BRPM | HEART RATE: 72 BPM

## 2022-04-05 NOTE — PAYOR INFORMATION
Verified Demographics with Patient? No   If no why? Already verified  [facility] Completed? No    If not why? Already completed  Verified Insurance through: Already verified  [employer] Completed? Not required  Auth Verification #:[id number]  Liability Due: $0  Discount Offered: na%       Previous Balance: $ 0   Discount Offered: na%  Site Collect Status: no liability  Patient Response: no liability  Financial Aid Offered?  Yes Pt declined  Cards Scanned: photo ID- yes / insurance- not available at T[facility]

## 2022-04-08 RX ORDER — FLUTICASONE PROPIONATE 50 MCG
SPRAY, SUSPENSION (ML) NASAL
Qty: 16 G | Refills: 0 | OUTPATIENT
Start: 2022-04-08

## 2022-04-12 ENCOUNTER — HOSPITAL ENCOUNTER (OUTPATIENT)
Dept: NUCLEAR MEDICINE | Age: 37
Discharge: HOME | End: 2022-04-14
Payer: MEDICARE

## 2022-04-12 DIAGNOSIS — G89.29 OTHER CHRONIC PAIN: ICD-10-CM

## 2022-04-12 PROCEDURE — 3430000000 HC RX DIAGNOSTIC RADIOPHARMACEUTICAL: Performed by: INTERNAL MEDICINE

## 2022-04-12 PROCEDURE — A9537 TC99M MEBROFENIN: HCPCS | Performed by: INTERNAL MEDICINE

## 2022-04-12 PROCEDURE — 78227 HEPATOBIL SYST IMAGE W/DRUG: CPT

## 2022-04-12 PROCEDURE — 2580000003 HC RX 258: Performed by: INTERNAL MEDICINE

## 2022-04-12 RX ORDER — SODIUM CHLORIDE 0.9 % (FLUSH) 0.9 %
10 SYRINGE (ML) INJECTION PRN
Status: DISCONTINUED | OUTPATIENT
Start: 2022-04-12 | End: 2022-04-15 | Stop reason: HOSPADM

## 2022-04-12 RX ADMIN — SODIUM CHLORIDE, PRESERVATIVE FREE 10 ML: 5 INJECTION INTRAVENOUS at 09:51

## 2022-04-12 RX ADMIN — Medication 5.3 MILLICURIE: at 09:51

## 2022-04-12 NOTE — TELEPHONE ENCOUNTER
Writer called the patient on 4/12/22. Writer asked the patient if she was aware that she scheduled her appointment at the [facility] location. Patient stated that she was not aware. Patient was rescheduled for 4/13/22 at 9:15 am with [facility]. Patient verbalized understanding and thanked the writer.

## 2022-04-12 NOTE — TELEPHONE ENCOUNTER
Pt called saying that she received a call to come in sooner for a follow up. Writer looked through pts chart, didn't see any documentation.  What day and time do you want her in?

## 2022-04-13 ENCOUNTER — HOSPITAL ENCOUNTER (OUTPATIENT)
Age: 37
Setting detail: SPECIMEN
Discharge: HOME | End: 2022-04-13
Payer: MEDICARE

## 2022-04-13 ENCOUNTER — OFFICE VISIT (OUTPATIENT)
Dept: GASTROENTEROLOGY | Age: 37
End: 2022-04-13
Payer: MEDICARE

## 2022-04-13 VITALS
OXYGEN SATURATION: 100 % | BODY MASS INDEX: 26.82 KG/M2 | HEIGHT: 63 IN | DIASTOLIC BLOOD PRESSURE: 82 MMHG | WEIGHT: 151.4 LBS | SYSTOLIC BLOOD PRESSURE: 125 MMHG | HEART RATE: 79 BPM

## 2022-04-13 DIAGNOSIS — R06.83 SNORING: ICD-10-CM

## 2022-04-13 DIAGNOSIS — K21.9 CHRONIC GERD: ICD-10-CM

## 2022-04-13 DIAGNOSIS — F41.9 ANXIETY: ICD-10-CM

## 2022-04-13 DIAGNOSIS — R10.11 RUQ PAIN: Primary | ICD-10-CM

## 2022-04-13 DIAGNOSIS — R19.7 DIARRHEA, UNSPECIFIED TYPE: ICD-10-CM

## 2022-04-13 LAB — TSH SERPL DL<=0.05 MIU/L-ACNC: 0.35 UIU/ML (ref 0.3–5)

## 2022-04-13 PROCEDURE — 1036F TOBACCO NON-USER: CPT | Performed by: NURSE PRACTITIONER

## 2022-04-13 PROCEDURE — G8427 DOCREV CUR MEDS BY ELIG CLIN: HCPCS | Performed by: NURSE PRACTITIONER

## 2022-04-13 PROCEDURE — 36415 COLL VENOUS BLD VENIPUNCTURE: CPT

## 2022-04-13 PROCEDURE — 83516 IMMUNOASSAY NONANTIBODY: CPT

## 2022-04-13 PROCEDURE — 84443 ASSAY THYROID STIM HORMONE: CPT

## 2022-04-13 PROCEDURE — 99214 OFFICE O/P EST MOD 30 MIN: CPT | Performed by: NURSE PRACTITIONER

## 2022-04-13 PROCEDURE — G8419 CALC BMI OUT NRM PARAM NOF/U: HCPCS | Performed by: NURSE PRACTITIONER

## 2022-04-13 RX ORDER — PANTOPRAZOLE SODIUM 40 MG/1
40 TABLET, DELAYED RELEASE ORAL
Qty: 180 TABLET | Refills: 1 | Status: SHIPPED | OUTPATIENT
Start: 2022-04-13 | End: 2022-11-01

## 2022-04-13 ASSESSMENT — ENCOUNTER SYMPTOMS
ABDOMINAL DISTENTION: 1
ABDOMINAL PAIN: 1
SORE THROAT: 1
CONSTIPATION: 0
BLOOD IN STOOL: 0
SHORTNESS OF BREATH: 0
BACK PAIN: 0
CHOKING: 1
ANAL BLEEDING: 0
DIARRHEA: 1
RECTAL PAIN: 0
COUGH: 1
TROUBLE SWALLOWING: 0
VOMITING: 1
VOICE CHANGE: 0
NAUSEA: 1

## 2022-04-13 NOTE — PROGRESS NOTES
GI CLINIC FOLLOW UP    INTERVAL HISTORY:   No referring provider defined for this encounter. Chief Complaint   Patient presents with    Follow-up     Patient is here today to f/u on tests. HISTORY OF PRESENT ILLNESS:     Patient being seen for follow-up EGD, HIDA, sleep study. EGD revealing minimal patchy gastritis; no h. Pylori. Esophageal biopsies unremarkable. Continues to have heartburn despite PPI and H2 blocker therapy. Symptoms worse at night. Had sleep study however results not yet available. Also had HIDA d/t intermittent RUQ pain, bloating after meals. No acute cholecystitis. EF 93%. Reports chronic diarrhea. 3-5 loose bowel movements daily. No reported food allergies/intolerances. No significant weight loss. Denies any melena, hematochezia. Fair appetite. No reported dysphagia, odynophagia. Past Medical,Family, and Social History reviewed and does contribute to the patient presentingcondition. Patient's PMH/PSH,SH,PSYCH Hx, MEDs, ALLERGIES, and ROS were all reviewed and updated in the appropriate sections.     PAST MEDICAL HISTORY:  Past Medical History:   Diagnosis Date    Anxiety     Asthma     Bladder prolapse, female, acquired     HSV (herpes simplex virus) anogenital infection 12/26/2017    Mitral valve prolapse 2005    with regurg, has echo q 6 months, premed with dental visits    Ovary anomaly     reflux    Pelvic congestive syndrome     PONV (postoperative nausea and vomiting)     POTS (postural orthostatic tachycardia syndrome) 2012    Pulmonary hypertension ([facility] [address]        Past Surgical History:   Procedure Laterality Date    BREAST BIOPSY      BREAST ENHANCEMENT SURGERY Bilateral 2008    removed in 2011   [facility] Bilateral 2011    COLPOSCOPY  06/20/2016    Dr. Atul Scales Bilateral 2011   [id number]       2015 at [facility]     UPPER GASTROINTESTINAL ENDOSCOPY N/A 2/15/2022 EGD ESOPHAGOGASTRODUODENOSCOPY DILATATION AND STOMACH BIOPSY AND ESOPHAGUS performed by Scott Pinzon MD at [facility]:    Current Outpatient Medications:     pantoprazole (PROTONIX) 40 MG tablet, Take 1 tablet by mouth 2 times daily (before meals), Disp: 180 tablet, Rfl: 1    ibuprofen (ADVIL;MOTRIN) 800 MG tablet, Take 800 mg by mouth every 6 hours as needed for Pain, Disp: , Rfl:     fluconazole (DIFLUCAN) 150 MG tablet, Take 1 tablet 2 days after start antibiotic then take last tablet at end of antibiotic, Disp: 2 tablet, Rfl: 0    fluticasone (FLONASE) 50 MCG/ACT nasal spray, 2 sprays by Nasal route daily, Disp: 16 g, Rfl: 0    ondansetron (ZOFRAN ODT) 4 MG disintegrating tablet, Take 1 tablet by mouth every 6 hours as needed for Nausea or Vomiting, Disp: 30 tablet, Rfl: 0    Drospirenone (SLYND) 4 MG TABS, Take 1 tablet by mouth daily. , Disp: 84 tablet, Rfl: 0    albuterol sulfate  (90 Base) MCG/ACT inhaler, Inhale 2 puffs into the lungs every 6 hours as needed, Disp: , Rfl:     Cholecalciferol 50 MCG (2000 UT) TABS, Take 2,000 Units by mouth daily, Disp: , Rfl:     Clobetasol Propionate 0.05 % SHAM, Apply topically daily, Disp: , Rfl:     gabapentin (NEURONTIN) 800 MG tablet, Take 800 mg by mouth 2 times daily. , Disp: , Rfl:     hydroCHLOROthiazide (HYDRODIURIL) 12.5 MG tablet, Take 12.5 mg by mouth daily, Disp: , Rfl:     omeprazole (PRILOSEC) 20 MG delayed release capsule, Take 20 mg by mouth 2 times daily , Disp: , Rfl:     FLUoxetine (PROZAC) 40 MG capsule, Take 40 mg by mouth daily , Disp: , Rfl:     ondansetron (ZOFRAN ODT) 4 MG disintegrating tablet, Take 1 tablet by mouth every 8 hours as needed for Nausea, Disp: 20 tablet, Rfl: 0    ALPRAZolam (XANAX) 0.25 MG tablet, Take 0.25 mg by mouth 2 times daily. , Disp: , Rfl:     butalbital-APAP-caffeine -40 MG CAPS per capsule, Take 1 capsule by mouth every 4 hours as needed for Headaches, Disp: , Rfl:     valACYclovir (VALTREX) 1 g tablet, Take 1 tablet by mouth daily, Disp: 30 tablet, Rfl: 0    gabapentin (NEURONTIN) 600 MG tablet, Take 600 mg by mouth nightly. , Disp: , Rfl:     ALLERGIES:   Allergies   Allergen Reactions    Latex Swelling    Codeine Itching       FAMILY HISTORY:       Problem Relation Age of Onset    Sudden Death Paternal Grandfather         suicide    Diabetes Maternal Grandmother     Heart Attack Maternal Grandmother     Hypertension Maternal Grandfather     Colon Cancer Maternal Grandfather     Diabetes Mother     Hypertension Mother     Osteoporosis Paternal Grandmother          SOCIAL HISTORY:   Social History     Socioeconomic History    Marital status:      Spouse name: Not on file    Number of children: Not on file    Years of education: Not on file    Highest education level: Not on file   Occupational History    Not on file   Tobacco Use    Smoking status: Never Smoker    Smokeless tobacco: Never Used   Vaping Use    Vaping Use: Former    Substances: Never   Substance and Sexual Activity    Alcohol use: No    Drug use: Yes     Types: Marijuana Jaya Relic)    Sexual activity: Yes     Partners: Male   Other Topics Concern    Not on file   Social History Narrative    Not on file     Social Determinants of Health     Financial Resource Strain:     Difficulty of Paying Living Expenses: Not on file   Food Insecurity:     Worried About Running Out of Food in the Last Year: Not on file    Adriana of Food in the Last Year: Not on file   Transportation Needs:     Lack of Transportation (Medical): Not on file    Lack of Transportation (Non-Medical):  Not on file   Physical Activity:     Days of Exercise per Week: Not on file    Minutes of Exercise per Session: Not on file   Stress:     Feeling of Stress : Not on file   Social Connections:     Frequency of Communication with Friends and Family: Not on file    Frequency of Social Gatherings with Friends and Family: Not on file    Attends Taoist Services: Not on file    Active Member of Clubs or Organizations: Not on file    Attends Club or Organization Meetings: Not on file    Marital Status: Not on file   Intimate Partner Violence:     Fear of Current or Ex-Partner: Not on file    Emotionally Abused: Not on file    Physically Abused: Not on file    Sexually Abused: Not on file   Housing Stability:     Unable to Pay for Housing in the Last Year: Not on file    Number of [place] in the Last Year: Not on file    Unstable Housing in the Last Year: Not on file       REVIEW OF SYSTEMS: A 12-point review of systemswas obtained and pertinent positives and negatives were enumerated above in the history of present illness. All other reviewed systems / symptoms were negative. Review of Systems   Constitutional: Negative for appetite change, fatigue and unexpected weight change. HENT: Positive for sore throat. Negative for dental problem, trouble swallowing and voice change. Eyes: Negative for visual disturbance. Respiratory: Positive for cough and choking. Negative for shortness of breath. Cardiovascular: Positive for chest pain. Negative for leg swelling. Gastrointestinal: Positive for abdominal distention, abdominal pain, diarrhea, nausea and vomiting. Negative for anal bleeding, blood in stool, constipation and rectal pain. Genitourinary: Negative for difficulty urinating. Musculoskeletal: Negative for back pain, joint swelling and myalgias. Neurological: Positive for headaches. Negative for dizziness, tremors, weakness, light-headedness and numbness. Hematological: Bruises/bleeds easily. Psychiatric/Behavioral: Positive for sleep disturbance. The patient is nervous/anxious. PHYSICAL EXAMINATION: Vital signs reviewed per the nursing documentation.      /82   Pulse 79   Ht 5' 3\" (1.6 m)   Wt 151 lb 6.4 oz (68.7 kg)   SpO2 100% BMI 26.82 kg/m²   Body mass index is 26.82 kg/m². Physical Exam  Constitutional:       Appearance: Normal appearance. Eyes:      General: No scleral icterus. Pupils: Pupils are equal, round, and reactive to light. Cardiovascular:      Rate and Rhythm: Normal rate and regular rhythm. Heart sounds: Normal heart sounds. Pulmonary:      Effort: Pulmonary effort is normal.      Breath sounds: Normal breath sounds. Abdominal:      General: Bowel sounds are normal. There is no distension. Palpations: Abdomen is soft. There is no mass. Tenderness: There is no abdominal tenderness. There is no guarding. Skin:     General: Skin is warm and dry. Coloration: Skin is not jaundiced. Neurological:      Mental Status: She is alert and oriented to person, place, and time. Mental status is at baseline. LABORATORY DATA: Reviewed  Lab Results   Component Value Date    WBC 5.2 01/26/2022    HGB 12.1 01/26/2022    HCT 35.7 (L) 01/26/2022    MCV 86.3 01/26/2022     01/26/2022     01/26/2022    K 4.2 01/26/2022     01/26/2022    CO2 26 01/26/2022    BUN 10 01/26/2022    CREATININE 0.46 (L) 01/26/2022    LABALBU 4.4 01/26/2022    BILITOT 0.21 (L) 01/26/2022    ALKPHOS 58 01/26/2022    AST 16 01/26/2022    ALT 18 01/26/2022         Lab Results   Component Value Date    RBC 4.13 01/26/2022    HGB 12.1 01/26/2022    MCV 86.3 01/26/2022    MCH 29.3 01/26/2022    MCHC 34.0 01/26/2022    RDW 13.8 01/26/2022    MPV 9.0 01/26/2022    BASOPCT 1 01/26/2022    LYMPHSABS 1.50 01/26/2022    MONOSABS 0.40 01/26/2022    NEUTROABS 3.10 01/26/2022    EOSABS 0.10 01/26/2022    BASOSABS 0.00 01/26/2022         DIAGNOSTIC TESTING:     ESOPHAGOGASTRODUODENOSCOPY   ( EGD )  DATE OF PROCEDURE: 2/15/2022      SURGEON: Lyric Benitez MD     ASSISTANT: None     PREOPERATIVE DIAGNOSIS: Patient has chronic GERD, dysphagia, dyspepsia.   Procedure performed to evaluate upper GI lesion     POSTOPERATIVE DIAGNOSIS: Mild antral inflammation no esophageal pathology seen     OPERATION: Upper GI endoscopy with Biopsy, Stephens dilation of esophagus     ANESTHESIA: MAC     ESTIMATED BLOOD LOSS: None     COMPLICATIONS: None.      SPECIMENS:  Was Obtained: Random biopsies from the body and antrum to check for H. pylori also biopsies taken from the esophagus to rule out eosinophilic esophagitis     HISTORY: The patient is a 39y.o. year old female with history of above preop diagnosis. I recommended esophagogastroduodenoscopy with possible biopsy and I explained the risk, benefits, expected outcome, and alternatives to the procedure. Risks included but are not limited to bleeding, infection, respiratory distress, hypotension, and perforation of the esophagus, stomach, or duodenum. Patient understands and is in agreement.        PROCEDURE: The patient was given IV conscious sedation. The patient's SPO2 remained above 90% throughout the procedure. Cetacaine spray given. Patient placed in left lateral position. Olympus  videogastroscope was inserted orally under vision into the esophagus without difficulty and advanced into the stomach then through the pylorus up to the second part of duodenum.       Findings:     Retropharyngeal area was grossly normal appearing     Esophagus: normal.  No signs of peptic esophagitis or Pickard's mucosa seen. No evidence of eosinophilic esophagitis. Squamocolumnar junction is at about 35 cm and lower esophageal sphincter opens normally. No hiatal hernia noted. No esophageal luminal narrowing seen. Stomach:    Fundus and Cardia Examined in Retroflexed View: normal    Body: normal    Antrum: Mild antral inflammation noted. Multiple biopsies taken from the body and antrum evaluate H. pylori  Duodenum:     Descending: normal    Bulb: normal     Given patient's symptoms of dysphagia, I did dilate the esophagus using 54 Ethiopian Stephens dilator.   Dilation was carried out in the usual fashion. Following the dilatation I did check the esophagus with the gastroscope and no mucosal stretch marks seen.     Multiple random biopsies taken from the esophagus to check for eosinophilic esophagitis              While withdrawing the scope the above findings were verified and the scope was removed. The patient has tolerated the procedure without unusual events.            Recommendations/Plan:   1. F/U Biopsies  2. F/U In Office as instructed  3. Discussed with the family        Electronically signed by Beatriz Schwarz MD  on 2/15/2022 at 11:06 AM    Surgical Pathology Report -- Diagnosis --   A.  STOMACH, BIOPSY:   -GASTRIC ANTRAL/BODY TYPE MUCOSA WITH PATCHY MILD TO MODERATE CHRONIC   GASTRITIS.   -H. PYLORI STAIN IS NEGATIVE.  CONTROL REACTS AS EXPECTED. Kathleen Strickland, BIOPSY:   -UNREMARKABLE SQUAMOUS MUCOSA. Natasha Peñaloza M.D.   **Electronically Signed Out**         mercedes/2/17/2022         Clinical Information   Pre-Op Diagnosis: Andre Niece GERD   Operative Findings:  STOMACH BIOPSY; ESOPHAGUS BIOPSY   Operation Performed:  EGD AND STOMACH BIOPSY AND ESOPHAGUS     Source of Specimen   A: STOMACH BIOPSY   B: ESOPHAGUS BIOPSY     Gross Description   A.  \"YOBANI SAUNDRA, STOMACH BIOPSY\" Five tan-white tissue fragments from   0.2 to 0.6 cm and are 1.3 x 0.4 x 0.1 cm in aggregate.  Entirely 1cs.    B.  \"YOBANI SAUNDRA, ESOPHAGUS BIOPSY\" Three tan-white tissue fragments   from 0.3 to 0.5 cm and are 1.3 x 0.4 x 0.1 cm in aggregate.  Entirely   1cs.  yr tm       Microscopic Description   A, B.  Microscopic examination performed.  H. pylori immunostain   performed because no organisms apparent on H&E.     SURGICAL PATHOLOGY CONSULTATION         Patient Name: Edmund Hays [address] [place] [address] St: [id number]   Path Number: [id number]     [address],  [address]. [place], [address]   [phone]   Fax: [phone] [place] HEPATOBILIARY SCAN W EJECTION FRACTION    Result Date: 4/12/2022  EXAMINATION: NUCLEAR MEDICINE HEPATOBILIARY SCINTIGRAPHY (HIDA SCAN) WITH EJECTION FRACTION. TECHNIQUE: Approximately 5.3 millicuries FA71K Mebrofenin (Choletec) was administered IV. Then, dynamic images of the abdomen were obtained in the anterior projection for 60 mins. A right lateral view was also obtained at 60 mins. Due to a shortage/inavailability of CCK, one can (237 ml) Ensure plus was substitued orally. Images were obtained in the Latvian projection and regions of interest were drawn around the gallbladder and ejection fraction was calculated. COMPARISON: Ultrasound 01/26/2022 HISTORY: ORDERING SYSTEM PROVIDED HISTORY: Other chronic pain TECHNOLOGIST PROVIDED HISTORY: Is the patient pregnant? ->Yes Reason for Exam: other chronic pain Additional signs and symptoms: pain, nausea and vomiting after eating fatty meals FINDINGS: Prompt, homogenous uptake by the liver is noted with normal appearance of radiotracer excretion into the biliary system. Clearance of bloodpool activity appears appropriate. Gallbladder and small bowel is visualized in appropriate sequence and time. Gallbladder ejection fraction measured 92%. Normal value is >33% for Ensure protocol. Note, Ensure normal range is based on a limited study. 1.  Gallbladder ejection fraction is 92%. 2.  No acute cholecystitis. IMPRESSION: Ms. Lonnie Acevedo is a 40 y.o. female with    Diagnosis Orders   1. RUQ pain  JUANITA - Sharon Russo MD, General Surgery, [place]   2. Diarrhea, unspecified type  Pancreatic elastase, fecal    Giardia antigen    Fecal Fat, Qualitative    Tissue Transglutaminase, IgA    TSH   3. Chronic GERD     4. Snoring     5. Anxiety       EGD revealed minimal patchy gastritis; no H. Pylori. Esophageal biopsies unremarkable. Sleep study pending. Will optimize PPI therapy and H2 blocker therapy. Continue to follow antireflux measures.   Stool studies for chronic diarrhea. Will refer to general surgery for possible cholecystectomy; high EF with recurrent RUQ pain. PUD ruled out, on PPI therapy with minimal improvement    Follow-up two weeks. Thank you for allowing me to participate in the care of Ms. Travis. For any further questions please do not hesitate to contact me. I have reviewed and agree with the ROS entered by the MA/JHONATHAN.          MELINA Perez    [facility]  Office #: [phone]

## 2022-04-14 LAB — TISSUE TRANSGLUTAMINASE IGA: 0.1 U/ML

## 2022-04-19 ENCOUNTER — PATIENT MESSAGE (OUTPATIENT)
Dept: OBGYN CLINIC | Age: 37
End: 2022-04-19

## 2022-04-19 NOTE — TELEPHONE ENCOUNTER
From: Santiago Lara  To: Ling Lane  Sent: 4/19/2022 1:56 PM EDT  Subject: Update on health    I was recently diagnosed with an autoimmune disorder, systemic sclerosis. I am starting treatment at [address] have additional diagnoses. I don't know if this affects anything on your end but I just wanted to cover my bases.

## 2022-04-20 LAB — STATUS: NORMAL

## 2022-04-20 PROCEDURE — 95810 POLYSOM 6/> YRS 4/> PARAM: CPT | Performed by: STUDENT IN AN ORGANIZED HEALTH CARE EDUCATION/TRAINING PROGRAM

## 2022-04-21 ENCOUNTER — HOSPITAL ENCOUNTER (OUTPATIENT)
Age: 37
Setting detail: SPECIMEN
Discharge: HOME | End: 2022-04-21
Payer: MEDICARE

## 2022-04-21 DIAGNOSIS — R19.7 DIARRHEA, UNSPECIFIED TYPE: ICD-10-CM

## 2022-04-21 PROCEDURE — 83520 IMMUNOASSAY QUANT NOS NONAB: CPT

## 2022-04-21 PROCEDURE — 87329 GIARDIA AG IA: CPT

## 2022-04-21 PROCEDURE — 82705 FATS/LIPIDS FECES QUAL: CPT

## 2022-04-24 LAB
FAT QUALITATIVE SPLIT STOOL: NORMAL
FECAL NEUTRAL FAT: NORMAL

## 2022-04-25 LAB
DIRECT EXAM: NORMAL
FECAL PANCREATIC ELASTASE-1: 309 UG/G
SPECIMEN DESCRIPTION: NORMAL

## 2022-05-31 RX ORDER — DROSPIRENONE 4 MG/1
TABLET, FILM COATED ORAL
Qty: 84 TABLET | Refills: 0 | Status: SHIPPED | OUTPATIENT
Start: 2022-05-31 | End: 2022-06-30

## 2022-05-31 NOTE — TELEPHONE ENCOUNTER
She was started on this at annual in January she was supposed to schedule 3 month med check with me if wants to continue or if not helping surgical consult with Dr. Mustapha Hannah please contact pt to schedule appropriate follow up she can do VV with either of us

## 2022-06-14 DIAGNOSIS — N64.4 BREAST PAIN, LEFT: ICD-10-CM

## 2022-06-14 DIAGNOSIS — N63.20 LEFT BREAST LUMP: Primary | ICD-10-CM

## 2022-06-17 ENCOUNTER — HOSPITAL ENCOUNTER (OUTPATIENT)
Dept: WOMENS IMAGING | Age: 37
Discharge: HOME | End: 2022-06-19
Payer: MEDICARE

## 2022-06-17 DIAGNOSIS — N63.20 LEFT BREAST LUMP: ICD-10-CM

## 2022-06-17 DIAGNOSIS — N64.4 BREAST PAIN, LEFT: ICD-10-CM

## 2022-06-17 PROCEDURE — 76642 ULTRASOUND BREAST LIMITED: CPT

## 2022-06-17 PROCEDURE — G0279 TOMOSYNTHESIS, MAMMO: HCPCS

## 2022-06-27 ENCOUNTER — TELEPHONE (OUTPATIENT)
Dept: OBGYN CLINIC | Age: 37
End: 2022-06-27

## 2022-06-27 DIAGNOSIS — N64.4 BREAST PAIN, LEFT: ICD-10-CM

## 2022-06-27 DIAGNOSIS — N63.20 LEFT BREAST MASS: Primary | ICD-10-CM

## 2022-06-27 NOTE — TELEPHONE ENCOUNTER
Pt was made aware of her results- pt states it does get bigger at times and does have pain with it- stated may need a second opinion with breast surgeon stated will route [facility].

## 2022-06-27 NOTE — TELEPHONE ENCOUNTER
----- Message from BALDO Cameron - CNP sent at 6/24/2022 11:34 AM EDT -----  US and mammogram revealed likely benign cyst or lymph node monitor at this time if increase in size persist let us know

## 2022-06-30 ENCOUNTER — ANESTHESIA EVENT (OUTPATIENT)
Dept: OPERATING ROOM | Age: 37
End: 2022-06-30
Payer: MEDICARE

## 2022-06-30 ENCOUNTER — HOSPITAL ENCOUNTER (OUTPATIENT)
Dept: PREADMISSION TESTING | Age: 37
Setting detail: OUTPATIENT SURGERY
Discharge: HOME | End: 2022-07-04
Payer: MEDICARE

## 2022-06-30 VITALS
RESPIRATION RATE: 18 BRPM | HEART RATE: 73 BPM | SYSTOLIC BLOOD PRESSURE: 116 MMHG | TEMPERATURE: 97.3 F | WEIGHT: 150 LBS | HEIGHT: 63 IN | BODY MASS INDEX: 26.58 KG/M2 | DIASTOLIC BLOOD PRESSURE: 71 MMHG | OXYGEN SATURATION: 98 %

## 2022-06-30 DIAGNOSIS — Z01.818 PREOP EXAMINATION: ICD-10-CM

## 2022-06-30 PROBLEM — R35.0 URINARY FREQUENCY: Status: ACTIVE | Noted: 2021-09-13

## 2022-06-30 PROBLEM — R32 URINARY INCONTINENCE: Status: ACTIVE | Noted: 2022-01-14

## 2022-06-30 PROBLEM — N94.89 FEMALE PELVIC CONGESTION SYNDROME: Status: ACTIVE | Noted: 2021-09-13

## 2022-06-30 PROBLEM — I83.893 VARICOSE VEINS OF LEG WITH SWELLING, BILATERAL: Status: ACTIVE | Noted: 2021-11-04

## 2022-06-30 PROBLEM — I10 ESSENTIAL (PRIMARY) HYPERTENSION: Status: ACTIVE | Noted: 2022-01-14

## 2022-06-30 PROBLEM — G43.909 MIGRAINE, UNSPECIFIED, NOT INTRACTABLE, WITHOUT STATUS MIGRAINOSUS: Status: ACTIVE | Noted: 2022-01-14

## 2022-06-30 PROBLEM — K21.9 GASTRO-ESOPHAGEAL REFLUX DISEASE WITHOUT ESOPHAGITIS: Status: ACTIVE | Noted: 2022-01-14

## 2022-06-30 PROBLEM — I48.91 UNSPECIFIED ATRIAL FIBRILLATION (HCC): Status: ACTIVE | Noted: 2022-01-14

## 2022-06-30 PROBLEM — R06.2 WHEEZING: Status: ACTIVE | Noted: 2022-01-14

## 2022-06-30 PROBLEM — I86.3 LABIAL VARICOSITIES: Status: ACTIVE | Noted: 2021-09-13

## 2022-06-30 PROBLEM — I87.2 VENOUS (PERIPHERAL) INSUFFICIENCY: Status: ACTIVE | Noted: 2021-10-01

## 2022-06-30 PROBLEM — L30.9 DERMATITIS, UNSPECIFIED: Status: ACTIVE | Noted: 2022-01-14

## 2022-06-30 PROBLEM — U07.1 COVID-19: Status: ACTIVE | Noted: 2022-01-14

## 2022-06-30 PROBLEM — F32.9 MAJOR DEPRESSIVE DISORDER, SINGLE EPISODE, UNSPECIFIED: Status: ACTIVE | Noted: 2022-01-14

## 2022-06-30 LAB
ABSOLUTE EOS #: 0.1 K/UL (ref 0–0.4)
ABSOLUTE LYMPH #: 2.3 K/UL (ref 1–4.8)
ABSOLUTE MONO #: 0.5 K/UL (ref 0.1–1.3)
ALBUMIN SERPL-MCNC: 4.7 G/DL (ref 3.5–5.2)
ALP BLD-CCNC: 59 U/L (ref 35–104)
ALT SERPL-CCNC: 19 U/L (ref 5–33)
AMYLASE: 62 U/L (ref 28–100)
ANION GAP SERPL CALCULATED.3IONS-SCNC: 12 MMOL/L (ref 9–17)
AST SERPL-CCNC: 34 U/L
BASOPHILS # BLD: 1 % (ref 0–2)
BASOPHILS ABSOLUTE: 0 K/UL (ref 0–0.2)
BILIRUB SERPL-MCNC: 0.44 MG/DL (ref 0.3–1.2)
BILIRUBIN DIRECT: 0.13 MG/DL
BILIRUBIN, INDIRECT: 0.31 MG/DL (ref 0–1)
BUN BLDV-MCNC: 9 MG/DL (ref 6–20)
CALCIUM SERPL-MCNC: 9.7 MG/DL (ref 8.6–10.4)
CHLORIDE BLD-SCNC: 103 MMOL/L (ref 98–107)
CO2: 26 MMOL/L (ref 20–31)
CREAT SERPL-MCNC: 0.68 MG/DL (ref 0.5–0.9)
EOSINOPHILS RELATIVE PERCENT: 2 % (ref 0–4)
GFR AFRICAN AMERICAN: >60 ML/MIN
GFR NON-AFRICAN AMERICAN: >60 ML/MIN
GFR SERPL CREATININE-BSD FRML MDRD: NORMAL ML/MIN/{1.73_M2}
GLUCOSE BLD-MCNC: 94 MG/DL (ref 70–99)
HCT VFR BLD CALC: 34.1 % (ref 36–46)
HEMOGLOBIN: 11.9 G/DL (ref 12–16)
LIPASE: 13 U/L (ref 13–60)
LYMPHOCYTES # BLD: 39 % (ref 24–44)
MCH RBC QN AUTO: 29.7 PG (ref 26–34)
MCHC RBC AUTO-ENTMCNC: 34.8 G/DL (ref 31–37)
MCV RBC AUTO: 85.4 FL (ref 80–100)
MONOCYTES # BLD: 9 % (ref 1–7)
PDW BLD-RTO: 14.2 % (ref 11.5–14.9)
PLATELET # BLD: 274 K/UL (ref 150–450)
PMV BLD AUTO: 8.6 FL (ref 6–12)
POTASSIUM SERPL-SCNC: 5 MMOL/L (ref 3.7–5.3)
RBC # BLD: 4 M/UL (ref 4–5.2)
SEG NEUTROPHILS: 49 % (ref 36–66)
SEGMENTED NEUTROPHILS ABSOLUTE COUNT: 2.9 K/UL (ref 1.3–9.1)
SODIUM BLD-SCNC: 141 MMOL/L (ref 135–144)
TOTAL PROTEIN: 7.9 G/DL (ref 6.4–8.3)
WBC # BLD: 5.9 K/UL (ref 3.5–11)

## 2022-06-30 PROCEDURE — 85025 COMPLETE CBC W/AUTO DIFF WBC: CPT

## 2022-06-30 PROCEDURE — 80076 HEPATIC FUNCTION PANEL: CPT

## 2022-06-30 PROCEDURE — 82150 ASSAY OF AMYLASE: CPT

## 2022-06-30 PROCEDURE — 80048 BASIC METABOLIC PNL TOTAL CA: CPT

## 2022-06-30 PROCEDURE — 36415 COLL VENOUS BLD VENIPUNCTURE: CPT

## 2022-06-30 PROCEDURE — 83690 ASSAY OF LIPASE: CPT

## 2022-06-30 PROCEDURE — APPSS45 APP SPLIT SHARED TIME 31-45 MINUTES: Performed by: NURSE PRACTITIONER

## 2022-06-30 PROCEDURE — 93005 ELECTROCARDIOGRAM TRACING: CPT | Performed by: NURSE PRACTITIONER

## 2022-06-30 RX ORDER — CLONAZEPAM 0.5 MG/1
0.5 TABLET ORAL 3 TIMES DAILY PRN
COMMUNITY

## 2022-06-30 ASSESSMENT — ENCOUNTER SYMPTOMS
SHORTNESS OF BREATH: 1
CHEST TIGHTNESS: 1
BACK PAIN: 1
DIARRHEA: 1
VOMITING: 1
ROS SKIN COMMENTS: ECZEMA
COUGH: 1
ABDOMINAL PAIN: 1
APNEA: 1
NAUSEA: 1
EYE PAIN: 1

## 2022-06-30 ASSESSMENT — PAIN DESCRIPTION - DESCRIPTORS: DESCRIPTORS: CRAMPING

## 2022-06-30 ASSESSMENT — PAIN DESCRIPTION - LOCATION: LOCATION: LEG

## 2022-06-30 ASSESSMENT — PAIN DESCRIPTION - ORIENTATION: ORIENTATION: RIGHT;LEFT

## 2022-06-30 ASSESSMENT — PAIN SCALES - GENERAL: PAINLEVEL_OUTOF10: 3

## 2022-06-30 ASSESSMENT — PAIN DESCRIPTION - FREQUENCY: FREQUENCY: INTERMITTENT

## 2022-06-30 NOTE — PROGRESS NOTES
physical assessment will be performed by a nurse practitioner or house officer. Your IV will be started and you will meet your anesthesiologist.    · When you go to surgery, your family will be directed to the surgical waiting room, where the doctor should speak with them after your surgery. · After surgery, you will be taken to the recovery room then when you are awake and stable you will go to the short stay unit for preparation to be discharged. · If you use a Bi-PAP or C-PAP machine, please bring it with you and leave it in the car in case it is needed in recovery room.

## 2022-06-30 NOTE — H&P
HISTORY and Treinta L[facility] [address]       NAME:  Johny Gr  MRN: [id number]   YOB: 1985   Date: 6/30/2022   Age: 40 y.o. Gender: female     COMPLAINT AND PRESENT HISTORY:   Johny Gr is 40 y.o.,  female, presents for pre-anesthesia/admission testing for CHOLECYSTECTOMY LAPAROSCOPIC ROBOTIC XI per . Primary dx: GALL BLADDER DISEASE.    HPI:  RUQ pian:   Johny Gr is 40 y.o.,   female, complains of RUQ and Epigastric pains. The pain is sharp and squeezing in character,  related to food. Pain radiates to the Rt back but not to the shoulder. Symptoms started 4 months ago Pt states she cannot eat greasy and spicy foods wiich increases her RUQ pain. Pt also complains of nausea, Vomiting, diarrhea. Pt was in the ER  on 1/26/22 due to abdominal pain and she had abdomen CT which it was within normal limits. Also she had EGD done by Dr Claudean Coder which showed  Mild to moderate chronic gastritis . Also she did have a HIDA scan that showed hyperdynamic gallbladder . Pt denies any change in the color of the stools. No fever or chills, no chest pain or SOB.       Testing completed r/t condition:  NM HEPATOBILIARY SCAN W EJECTION FRACTION  FINDINGS:   Prompt, homogenous uptake by the liver is noted with normal appearance of   radiotracer excretion into the biliary system.  Clearance of bloodpool   activity appears appropriate.       Gallbladder and small bowel is visualized in appropriate sequence and time.       Gallbladder ejection fraction measured 92%.       Normal value is >33% for Ensure protocol.  Note, Ensure normal range is based   on a limited study.           Impression   1.  Gallbladder ejection fraction is 92%.       2.  No acute cholecystitis.         Narrative   EXAMINATION:   CT OF THE ABDOMEN AND PELVIS WITH CONTRAST 1/26/2022 6:15 pm       TECHNIQUE:   CT of the abdomen and pelvis was performed with the administration of   intravenous contrast. unremarkable without evidence of   pericholecystic fluid, wall thickening or stones.  Negative sonographic   Bassett's sign.       Common bile duct is within normal limits measuring 4 mm.       RIGHT KIDNEY: The right kidney is without evidence of hydronephrosis.  Right   renal length is 10.56 cm.  2 cm simple cyst.       PANCREAS:  Visualized portions of the pancreas are unremarkable.       OTHER: No evidence of right upper quadrant ascites.           Impression   Negative right upper quadrant ultrasound apart from right renal cyst as   measured above. Review of additional significant medical hx:  MVP, POTS, PULMONARY HTN,NONRHEUMATIC MVI, NONRHEUMATIC TVR, PALPITATIONS, HTN, A-FIB, POTS      Current medications r/t condition: HCTZ     ECHO, 5-27-22:  CONCLUSIONS:   - Exam indication: Hypertension Evaluation, POTS, MR   - The left ventricle is normal in size. Left ventricular systolic function is   normal. EF = 67 ± 5% (2D biplane) Normal left ventricular diastolic function.   - The right ventricle is normal in size. Right ventricular systolic function is   normal.   - The patient has not had a prior CC echocardiographic exam for comparison. Electronically signed by Nestor Garnica MD on 5/27/2022 at 1:51:20 PM      NM LUNG VENT / PERF VQ results   NM LUNG VENT / PERF VQ  Anatomical Region Laterality Modality  -- -- Nuclear Medicine    Impression  Performed by DIVISION OF RADIOLOGY  IMPRESSION:   Normal VQ scan. If there is a discordant clinical likelihood of   thromboembolic disease, additional evaluation with lower extremity venous   Dopplers and/or contrast-enhanced PE protocol chest CT may be performed.    : SCOTTIE     Transcribe Date/Time: Jun 28 2022  9:28A   Dictated by : Denise Kan MD   This examination was interpreted and the report reviewed and   electronically signed by:   Denise Kan MD on Jun 28 2022  9:29AM  EST    CT Chest: 5/3/2022  1.  No CT evidence of fibrotic interstitial lung disease.  Mild air   trapping is probably due to small airway disease in this patient with a   reported history of asthma. 2.  No suspicious pulmonary nodule or thoracic lymphadenopathy    ASTHMA  Current medications r/t condition: ALBUTEROL  Pt follow up Pulmonary medicine   Dr Carolynn Herron at [address] [place] [address]        Periorificial dermatitis   Pt follow up with Dr Eva Benitez [facility] at [address] [place] [address]   Current medication r/t condition : topical hydrocortisone 2.5% ointment     History of arterial disease of lower extremity:  Pt is follow up with vascular DR at [facility] Dr Bindu Hummel     Pt had [address] BILATERAL 4/2022 AT [address]  IR     She has coil embolization both left and right ovarian veins (16 stents in place)     IR embolization vein non hemorrhage non tumor  IMPRESSION:   1.  Refluxing bilateral ovarian veins were successfully embolized using Asclera and Ishaan coils. 2.  Prominent right palpable varicosities supplied by the right ovarian vein and the right internal iliac vein. 3.  Successful coil embolization of two branches of the right internal iliac vein is draining the vulvar varicosities.  BRE ( obstructive sleep apnea )  Currently pt is not using any machine       HSV  Currently medication r/t condition : Valtrex       Activity level:  Functional Capacity per patient:  1. Patient not able to walk 2 city blocks on level ground without SO and pian in her legs  2. Patient not able to climb 2 flights of stairs without SOB and pian in her legs  3. Patient not able to walk up a hill or 1-2 city blocks without SOB and pian in her legs    Denies hx of MRSA infection. Denies hx of blood clots. Denies hx of any personal or family hx of complications w/anesthesia.   Pt has PONV    PAST MEDICAL HISTORY     Past Medical History:   Diagnosis Date    Anxiety     Asthma     Bladder prolapse, female, acquired     Body piercing     nose, tobacco: Never Used   Vaping Use    Vaping Use: Former    Substances: Never   Substance and Sexual Activity    Alcohol use: No    Drug use: Yes     Types: Marijuana (Weed)     Comment: 4x weekly or less in gummy form     Sexual activity: Yes     Partners: Male   Other Topics Concern    None   Social History Narrative    None     Social Determinants of Health     Financial Resource Strain:     Difficulty of Paying Living Expenses: Not on file   Food Insecurity:     Worried About Running Out of Food in the Last Year: Not on file    Adriana of Food in the Last Year: Not on file   Transportation Needs:     Lack of Transportation (Medical): Not on file    Lack of Transportation (Non-Medical): Not on file   Physical Activity:     Days of Exercise per Week: Not on file    Minutes of Exercise per Session: Not on file   Stress:     Feeling of Stress : Not on file   Social Connections:     Frequency of Communication with Friends and Family: Not on file    Frequency of Social Gatherings with Friends and Family: Not on file    Attends Anabaptism Services: Not on file    Active Member of [address] [place] [address] or Organizations: Not on file    Attends Club or Organization Meetings: Not on file    Marital Status: Not on file   Intimate Partner Violence:     Fear of Current or Ex-Partner: Not on file    Emotionally Abused: Not on file    Physically Abused: Not on file    Sexually Abused: Not on file   Housing Stability:     Unable to Pay for Housing in the Last Year: Not on file    Number of [place] in the Last Year: Not on file    Unstable Housing in the Last Year: Not on file       REVIEW OF SYSTEMS      Allergies   Allergen Reactions    Latex Swelling    Codeine Itching       Current Outpatient Medications on File Prior to Encounter   Medication Sig Dispense Refill    clonazePAM (KLONOPIN) 0.5 MG tablet Take 0.5 mg by mouth 3 times daily as needed.       pantoprazole (PROTONIX) 40 MG tablet Take 1 tablet by mouth 2 times daily (before meals) 180 tablet 1    ibuprofen (ADVIL;MOTRIN) 800 MG tablet Take 800 mg by mouth every 6 hours as needed for Pain      fluconazole (DIFLUCAN) 150 MG tablet Take 1 tablet 2 days after start antibiotic then take last tablet at end of antibiotic 2 tablet 0    fluticasone (FLONASE) 50 MCG/ACT nasal spray 2 sprays by Nasal route daily 16 g 0    albuterol sulfate  (90 Base) MCG/ACT inhaler Inhale 2 puffs into the lungs every 6 hours as needed      Cholecalciferol 50 MCG (2000 UT) TABS Take 2,000 Units by mouth daily      Clobetasol Propionate 0.05 % SHAM Apply topically daily      gabapentin (NEURONTIN) 800 MG tablet Take 800 mg by mouth 2 times daily.  hydroCHLOROthiazide (HYDRODIURIL) 12.5 MG tablet Take 12.5 mg by mouth daily      omeprazole (PRILOSEC) 20 MG delayed release capsule Take 20 mg by mouth 2 times daily       FLUoxetine (PROZAC) 40 MG capsule Take 40 mg by mouth daily       ondansetron (ZOFRAN ODT) 4 MG disintegrating tablet Take 1 tablet by mouth every 8 hours as needed for Nausea 20 tablet 0    butalbital-APAP-caffeine -40 MG CAPS per capsule Take 1 capsule by mouth every 4 hours as needed for Headaches      valACYclovir (VALTREX) 1 g tablet Take 1 tablet by mouth daily 30 tablet 0    gabapentin (NEURONTIN) 600 MG tablet Take 600 mg by mouth nightly. No current facility-administered medications on file prior to encounter. Review of Systems   Constitutional: Positive for activity change, appetite change, fatigue and unexpected weight change. Pt lost 30 pounds over 7 months    Eyes: Positive for pain and visual disturbance. Due to periorificial dermatitis    Respiratory: Positive for apnea, cough, chest tightness and shortness of breath. Cardiovascular: Positive for leg swelling. Gastrointestinal: Positive for abdominal pain, diarrhea, nausea and vomiting.         RUQ    Genitourinary: Positive for flank pain, frequency, hematuria and pelvic pain. Urine incontinent      Musculoskeletal: Positive for back pain, joint swelling and neck stiffness. Bilateral  knees , joint swelling bilateral hands    Skin:        eczema    Neurological: Positive for tremors, weakness, numbness and headaches. Hematological: Bruises/bleeds easily. GENERAL PHYSICAL EXAM     Vitals: /71   Pulse 73   Temp 97.3 °F (36.3 °C) (Temporal)   Resp 18   Ht 5' 3\" (1.6 m)   Wt 150 lb (68 kg)   LMP 06/17/2022 (Exact Date)   SpO2 98%   Breastfeeding No   BMI 26.57 kg/m²               Physical Exam  Constitutional:       Appearance: Normal appearance. She is normal weight. HENT:      Head: Normocephalic. Right periorbital erythema and left periorbital erythema present. Right Ear: External ear normal.      Left Ear: External ear normal.      Nose: Nose normal.      Mouth/Throat:      Mouth: Mucous membranes are moist.   Eyes:      General:         Right eye: No discharge. Left eye: No discharge. Neck:      Comments: Erythema   Cardiovascular:      Rate and Rhythm: Normal rate and regular rhythm. Pulses: Normal pulses. Heart sounds: Normal heart sounds. No murmur heard. Pulmonary:      Effort: Pulmonary effort is normal.      Breath sounds: Normal breath sounds. No wheezing or rhonchi. Abdominal:      General: Bowel sounds are normal. There is no distension. Palpations: Abdomen is soft. Tenderness: There is no abdominal tenderness. Hernia: No hernia is present. Musculoskeletal:         General: No swelling or tenderness. Normal range of motion. Cervical back: Normal range of motion and neck supple. Right lower leg: No edema. Left lower leg: No edema. Skin:     General: Skin is warm and dry. Coloration: Skin is not jaundiced. Findings: No bruising. Neurological:      General: No focal deficit present.       Mental Status: She is alert and oriented to person, place, and time. Motor: No weakness.       Gait: Gait normal.   Psychiatric:         Mood and Affect: Mood normal.         Behavior: Behavior normal.         LAB REVIEW     Lab Results   Component Value Date    WBC 5.9 06/30/2022    HGB 11.9 (L) 06/30/2022    HCT 34.1 (L) 06/30/2022    MCV 85.4 06/30/2022     06/30/2022     Lab Results   Component Value Date/Time     06/30/2022 01:30 PM    K 5.0 06/30/2022 01:30 PM     06/30/2022 01:30 PM    CO2 26 06/30/2022 01:30 PM    BUN 9 06/30/2022 01:30 PM    CREATININE 0.68 06/30/2022 01:30 PM    GLUCOSE 94 06/30/2022 01:30 PM    CALCIUM 9.7 06/30/2022 01:30 PM            PRELIMINARY EKG REVIEW, DATE:    ECG 6 /30/22  Normal sinus rhythm  Normal ECG     SURGERY / PROVISIONAL DIAGNOSES:      CHOLECYSTECTOMY LAPAROSCOPIC ROBOTIC XI    GALL BLADDER DISEASE    Patient Active Problem List    Diagnosis Date Noted    BRE (obstructive sleep apnea)     COVID-19 01/14/2022    Dermatitis, unspecified 01/14/2022    Essential (primary) hypertension 01/14/2022    Gastro-esophageal reflux disease without esophagitis 01/14/2022    Major depressive disorder, single episode, unspecified 01/14/2022    Migraine, unspecified, not intractable, without status migrainosus 01/14/2022    Unspecified atrial fibrillation ([address] 01/14/2022    Urinary incontinence 01/14/2022    Wheezing 01/14/2022    Varicose veins of leg with swelling, bilateral 11/04/2021    Venous (peripheral) insufficiency 10/01/2021    Female pelvic congestion syndrome 09/13/2021    Labial varicosities 09/13/2021    Urinary frequency 09/13/2021    Nonrheumatic mitral (valve) insufficiency 08/15/2018    Nonrheumatic tricuspid valve regurgitation 08/15/2018    Palpitations 08/15/2018    Herpes simplex vulvovaginitis 07/14/2017    Moderate episode of recurrent major depressive disorder ([address] 07/14/2017    Pulmonary hypertension ([address] 07/14/2017    Chronic nonintractable headache 2017    ASCUS with positive high risk HPV cervical 2016     14. F Apg 8/9 Wt 8#15 2014    Benzodiazepine abuse ([facility] 2013    Anxiety 2012    Asthma 2012           Dr. Toyin Joseph, anesthesia, was contacted and informed of patient's history and planned surgery. Orders received and no clearance required.       Total time spent on encounter- PAT provider minutes: 31-40 minutes     BALDO Killian CNP on 2022 at 5:37 PM

## 2022-07-01 ENCOUNTER — HOSPITAL ENCOUNTER (OUTPATIENT)
Age: 37
Setting detail: OUTPATIENT SURGERY
Discharge: HOME | End: 2022-07-01
Attending: SURGERY | Admitting: SURGERY
Payer: MEDICARE

## 2022-07-01 ENCOUNTER — ANESTHESIA (OUTPATIENT)
Dept: OPERATING ROOM | Age: 37
End: 2022-07-01
Payer: MEDICARE

## 2022-07-01 VITALS
RESPIRATION RATE: 16 BRPM | DIASTOLIC BLOOD PRESSURE: 70 MMHG | SYSTOLIC BLOOD PRESSURE: 109 MMHG | WEIGHT: 150 LBS | HEART RATE: 63 BPM | OXYGEN SATURATION: 97 % | HEIGHT: 63 IN | BODY MASS INDEX: 26.58 KG/M2 | TEMPERATURE: 97.1 F

## 2022-07-01 DIAGNOSIS — K82.9 GALL BLADDER DISEASE: ICD-10-CM

## 2022-07-01 DIAGNOSIS — K81.1 CHRONIC CHOLECYSTITIS: Primary | ICD-10-CM

## 2022-07-01 LAB
EKG ATRIAL RATE: 66 BPM
EKG P AXIS: 38 DEGREES
EKG P-R INTERVAL: 164 MS
EKG Q-T INTERVAL: 402 MS
EKG QRS DURATION: 86 MS
EKG QTC CALCULATION (BAZETT): 421 MS
EKG R AXIS: 39 DEGREES
EKG T AXIS: 28 DEGREES
EKG VENTRICULAR RATE: 66 BPM
HCG, PREGNANCY URINE (POC): NEGATIVE

## 2022-07-01 PROCEDURE — 3700000000 HC ANESTHESIA ATTENDED CARE: Performed by: SURGERY

## 2022-07-01 PROCEDURE — 7100000010 HC PHASE II RECOVERY - FIRST 15 MIN: Performed by: SURGERY

## 2022-07-01 PROCEDURE — 2580000003 HC RX 258: Performed by: ANESTHESIOLOGY

## 2022-07-01 PROCEDURE — 6360000002 HC RX W HCPCS: Performed by: ANESTHESIOLOGY

## 2022-07-01 PROCEDURE — A4216 STERILE WATER/SALINE, 10 ML: HCPCS | Performed by: ANESTHESIOLOGY

## 2022-07-01 PROCEDURE — 3600000019 HC SURGERY ROBOT ADDTL 15MIN: Performed by: SURGERY

## 2022-07-01 PROCEDURE — 7100000001 HC PACU RECOVERY - ADDTL 15 MIN: Performed by: SURGERY

## 2022-07-01 PROCEDURE — 2500000003 HC RX 250 WO HCPCS: Performed by: ANESTHESIOLOGY

## 2022-07-01 PROCEDURE — S2900 ROBOTIC SURGICAL SYSTEM: HCPCS | Performed by: SURGERY

## 2022-07-01 PROCEDURE — 7100000030 HC ASPR PHASE II RECOVERY - FIRST 15 MIN: Performed by: SURGERY

## 2022-07-01 PROCEDURE — 6370000000 HC RX 637 (ALT 250 FOR IP): Performed by: ANESTHESIOLOGY

## 2022-07-01 PROCEDURE — 3600000009 HC SURGERY ROBOT BASE: Performed by: SURGERY

## 2022-07-01 PROCEDURE — 88304 TISSUE EXAM BY PATHOLOGIST: CPT

## 2022-07-01 PROCEDURE — 6360000002 HC RX W HCPCS: Performed by: SURGERY

## 2022-07-01 PROCEDURE — 7100000011 HC PHASE II RECOVERY - ADDTL 15 MIN: Performed by: SURGERY

## 2022-07-01 PROCEDURE — 7100000000 HC PACU RECOVERY - FIRST 15 MIN: Performed by: SURGERY

## 2022-07-01 PROCEDURE — 2500000003 HC RX 250 WO HCPCS: Performed by: SURGERY

## 2022-07-01 PROCEDURE — 7100000031 HC ASPR PHASE II RECOVERY - ADDTL 15 MIN: Performed by: SURGERY

## 2022-07-01 PROCEDURE — 93010 ELECTROCARDIOGRAM REPORT: CPT | Performed by: INTERNAL MEDICINE

## 2022-07-01 PROCEDURE — 81025 URINE PREGNANCY TEST: CPT

## 2022-07-01 PROCEDURE — 3700000001 HC ADD 15 MINUTES (ANESTHESIA): Performed by: SURGERY

## 2022-07-01 PROCEDURE — 2709999900 HC NON-CHARGEABLE SUPPLY: Performed by: SURGERY

## 2022-07-01 RX ORDER — LIDOCAINE HYDROCHLORIDE 10 MG/ML
1 INJECTION, SOLUTION EPIDURAL; INFILTRATION; INTRACAUDAL; PERINEURAL
Status: DISCONTINUED | OUTPATIENT
Start: 2022-07-01 | End: 2022-07-01 | Stop reason: HOSPADM

## 2022-07-01 RX ORDER — SODIUM CHLORIDE 9 MG/ML
INJECTION, SOLUTION INTRAVENOUS PRN
Status: DISCONTINUED | OUTPATIENT
Start: 2022-07-01 | End: 2022-07-01 | Stop reason: HOSPADM

## 2022-07-01 RX ORDER — ONDANSETRON 2 MG/ML
INJECTION INTRAMUSCULAR; INTRAVENOUS PRN
Status: DISCONTINUED | OUTPATIENT
Start: 2022-07-01 | End: 2022-07-01 | Stop reason: SDUPTHER

## 2022-07-01 RX ORDER — BUPIVACAINE HYDROCHLORIDE 5 MG/ML
INJECTION, SOLUTION EPIDURAL; INTRACAUDAL PRN
Status: DISCONTINUED | OUTPATIENT
Start: 2022-07-01 | End: 2022-07-01 | Stop reason: ALTCHOICE

## 2022-07-01 RX ORDER — DIPHENHYDRAMINE HYDROCHLORIDE 50 MG/ML
12.5 INJECTION INTRAMUSCULAR; INTRAVENOUS
Status: DISCONTINUED | OUTPATIENT
Start: 2022-07-01 | End: 2022-07-01 | Stop reason: HOSPADM

## 2022-07-01 RX ORDER — DEXAMETHASONE SODIUM PHOSPHATE 4 MG/ML
INJECTION, SOLUTION INTRA-ARTICULAR; INTRALESIONAL; INTRAMUSCULAR; INTRAVENOUS; SOFT TISSUE PRN
Status: DISCONTINUED | OUTPATIENT
Start: 2022-07-01 | End: 2022-07-01 | Stop reason: SDUPTHER

## 2022-07-01 RX ORDER — METOCLOPRAMIDE HYDROCHLORIDE 5 MG/ML
INJECTION INTRAMUSCULAR; INTRAVENOUS PRN
Status: DISCONTINUED | OUTPATIENT
Start: 2022-07-01 | End: 2022-07-01 | Stop reason: SDUPTHER

## 2022-07-01 RX ORDER — MIDAZOLAM HYDROCHLORIDE 1 MG/ML
INJECTION INTRAMUSCULAR; INTRAVENOUS PRN
Status: DISCONTINUED | OUTPATIENT
Start: 2022-07-01 | End: 2022-07-01 | Stop reason: SDUPTHER

## 2022-07-01 RX ORDER — LIDOCAINE HYDROCHLORIDE 10 MG/ML
INJECTION, SOLUTION EPIDURAL; INFILTRATION; INTRACAUDAL; PERINEURAL PRN
Status: DISCONTINUED | OUTPATIENT
Start: 2022-07-01 | End: 2022-07-01 | Stop reason: SDUPTHER

## 2022-07-01 RX ORDER — SODIUM CHLORIDE, SODIUM LACTATE, POTASSIUM CHLORIDE, CALCIUM CHLORIDE 600; 310; 30; 20 MG/100ML; MG/100ML; MG/100ML; MG/100ML
INJECTION, SOLUTION INTRAVENOUS CONTINUOUS
Status: DISCONTINUED | OUTPATIENT
Start: 2022-07-01 | End: 2022-07-01 | Stop reason: HOSPADM

## 2022-07-01 RX ORDER — ONDANSETRON 2 MG/ML
4 INJECTION INTRAMUSCULAR; INTRAVENOUS
Status: DISCONTINUED | OUTPATIENT
Start: 2022-07-01 | End: 2022-07-01 | Stop reason: HOSPADM

## 2022-07-01 RX ORDER — FENTANYL CITRATE 50 UG/ML
INJECTION, SOLUTION INTRAMUSCULAR; INTRAVENOUS PRN
Status: DISCONTINUED | OUTPATIENT
Start: 2022-07-01 | End: 2022-07-01 | Stop reason: SDUPTHER

## 2022-07-01 RX ORDER — SODIUM CHLORIDE 0.9 % (FLUSH) 0.9 %
5-40 SYRINGE (ML) INJECTION EVERY 12 HOURS SCHEDULED
Status: DISCONTINUED | OUTPATIENT
Start: 2022-07-01 | End: 2022-07-01 | Stop reason: HOSPADM

## 2022-07-01 RX ORDER — SODIUM CHLORIDE 0.9 % (FLUSH) 0.9 %
5-40 SYRINGE (ML) INJECTION PRN
Status: DISCONTINUED | OUTPATIENT
Start: 2022-07-01 | End: 2022-07-01 | Stop reason: HOSPADM

## 2022-07-01 RX ORDER — PROPOFOL 10 MG/ML
INJECTION, EMULSION INTRAVENOUS PRN
Status: DISCONTINUED | OUTPATIENT
Start: 2022-07-01 | End: 2022-07-01 | Stop reason: SDUPTHER

## 2022-07-01 RX ORDER — SCOLOPAMINE TRANSDERMAL SYSTEM 1 MG/1
1 PATCH, EXTENDED RELEASE TRANSDERMAL
Status: DISCONTINUED | OUTPATIENT
Start: 2022-07-01 | End: 2022-07-01 | Stop reason: HOSPADM

## 2022-07-01 RX ORDER — ROCURONIUM BROMIDE 10 MG/ML
INJECTION, SOLUTION INTRAVENOUS PRN
Status: DISCONTINUED | OUTPATIENT
Start: 2022-07-01 | End: 2022-07-01 | Stop reason: SDUPTHER

## 2022-07-01 RX ORDER — OXYCODONE HYDROCHLORIDE AND ACETAMINOPHEN 5; 325 MG/1; MG/1
1 TABLET ORAL EVERY 6 HOURS PRN
Qty: 28 TABLET | Refills: 0 | Status: SHIPPED | OUTPATIENT
Start: 2022-07-01 | End: 2022-07-08

## 2022-07-01 RX ORDER — OXYCODONE HYDROCHLORIDE AND ACETAMINOPHEN 5; 325 MG/1; MG/1
1 TABLET ORAL ONCE
Status: COMPLETED | OUTPATIENT
Start: 2022-07-01 | End: 2022-07-01

## 2022-07-01 RX ORDER — CEPHALEXIN 500 MG/1
CAPSULE ORAL
Qty: 21 CAPSULE | Refills: 0 | Status: SHIPPED | OUTPATIENT
Start: 2022-07-01 | End: 2022-11-01

## 2022-07-01 RX ORDER — DIPHENHYDRAMINE HYDROCHLORIDE 50 MG/ML
INJECTION INTRAMUSCULAR; INTRAVENOUS PRN
Status: DISCONTINUED | OUTPATIENT
Start: 2022-07-01 | End: 2022-07-01 | Stop reason: SDUPTHER

## 2022-07-01 RX ORDER — ONDANSETRON 4 MG/1
TABLET, FILM COATED ORAL
Qty: 20 TABLET | Refills: 0 | Status: SHIPPED | OUTPATIENT
Start: 2022-07-01 | End: 2022-11-01

## 2022-07-01 RX ADMIN — PHENYLEPHRINE HYDROCHLORIDE 50 MCG: 10 INJECTION INTRAVENOUS at 13:23

## 2022-07-01 RX ADMIN — HYDROMORPHONE HYDROCHLORIDE 0.5 MG: 1 INJECTION, SOLUTION INTRAMUSCULAR; INTRAVENOUS; SUBCUTANEOUS at 14:12

## 2022-07-01 RX ADMIN — METOCLOPRAMIDE 10 MG: 5 INJECTION, SOLUTION INTRAMUSCULAR; INTRAVENOUS at 13:28

## 2022-07-01 RX ADMIN — MIDAZOLAM 2 MG: 1 INJECTION INTRAMUSCULAR; INTRAVENOUS at 12:15

## 2022-07-01 RX ADMIN — DIPHENHYDRAMINE HYDROCHLORIDE 12.5 MG: 50 INJECTION, SOLUTION INTRAMUSCULAR; INTRAVENOUS at 13:28

## 2022-07-01 RX ADMIN — ROCURONIUM BROMIDE 50 MG: 50 INJECTION, SOLUTION INTRAVENOUS at 12:20

## 2022-07-01 RX ADMIN — PROPOFOL 200 MG: 10 INJECTION, EMULSION INTRAVENOUS at 12:20

## 2022-07-01 RX ADMIN — SODIUM CHLORIDE, POTASSIUM CHLORIDE, SODIUM LACTATE AND CALCIUM CHLORIDE: 600; 310; 30; 20 INJECTION, SOLUTION INTRAVENOUS at 13:28

## 2022-07-01 RX ADMIN — PHENYLEPHRINE HYDROCHLORIDE 150 MCG: 10 INJECTION INTRAVENOUS at 13:03

## 2022-07-01 RX ADMIN — PHENYLEPHRINE HYDROCHLORIDE 100 MCG: 10 INJECTION INTRAVENOUS at 13:08

## 2022-07-01 RX ADMIN — LIDOCAINE HYDROCHLORIDE 40 MG: 10 INJECTION, SOLUTION EPIDURAL; INFILTRATION; INTRACAUDAL; PERINEURAL at 12:20

## 2022-07-01 RX ADMIN — HYDROMORPHONE HYDROCHLORIDE 0.5 MG: 1 INJECTION, SOLUTION INTRAMUSCULAR; INTRAVENOUS; SUBCUTANEOUS at 13:56

## 2022-07-01 RX ADMIN — FAMOTIDINE 20 MG: 10 INJECTION, SOLUTION INTRAVENOUS at 10:58

## 2022-07-01 RX ADMIN — ROCURONIUM BROMIDE 20 MG: 50 INJECTION, SOLUTION INTRAVENOUS at 12:51

## 2022-07-01 RX ADMIN — FENTANYL CITRATE 100 MCG: 50 INJECTION, SOLUTION INTRAMUSCULAR; INTRAVENOUS at 12:20

## 2022-07-01 RX ADMIN — SUGAMMADEX 200 MG: 100 INJECTION, SOLUTION INTRAVENOUS at 13:36

## 2022-07-01 RX ADMIN — FENTANYL CITRATE 25 MCG: 50 INJECTION, SOLUTION INTRAMUSCULAR; INTRAVENOUS at 13:30

## 2022-07-01 RX ADMIN — OXYCODONE HYDROCHLORIDE AND ACETAMINOPHEN 1 TABLET: 5; 325 TABLET ORAL at 14:53

## 2022-07-01 RX ADMIN — PHENYLEPHRINE HYDROCHLORIDE 100 MCG: 10 INJECTION INTRAVENOUS at 12:47

## 2022-07-01 RX ADMIN — SODIUM CHLORIDE, POTASSIUM CHLORIDE, SODIUM LACTATE AND CALCIUM CHLORIDE: 600; 310; 30; 20 INJECTION, SOLUTION INTRAVENOUS at 10:25

## 2022-07-01 RX ADMIN — CEFAZOLIN SODIUM 2000 MG: 10 INJECTION, POWDER, FOR SOLUTION INTRAVENOUS at 12:36

## 2022-07-01 RX ADMIN — ONDANSETRON 4 MG: 2 INJECTION INTRAMUSCULAR; INTRAVENOUS at 13:28

## 2022-07-01 RX ADMIN — DEXAMETHASONE SODIUM PHOSPHATE 4 MG: 4 INJECTION, SOLUTION INTRAMUSCULAR; INTRAVENOUS at 12:33

## 2022-07-01 RX ADMIN — PHENYLEPHRINE HYDROCHLORIDE 100 MCG: 10 INJECTION INTRAVENOUS at 12:46

## 2022-07-01 ASSESSMENT — PAIN DESCRIPTION - PAIN TYPE
TYPE: SURGICAL PAIN
TYPE: SURGICAL PAIN

## 2022-07-01 ASSESSMENT — PAIN SCALES - GENERAL
PAINLEVEL_OUTOF10: 8
PAINLEVEL_OUTOF10: 6
PAINLEVEL_OUTOF10: 6
PAINLEVEL_OUTOF10: 8
PAINLEVEL_OUTOF10: 10
PAINLEVEL_OUTOF10: 10

## 2022-07-01 ASSESSMENT — PAIN DESCRIPTION - LOCATION
LOCATION: ABDOMEN

## 2022-07-01 ASSESSMENT — PAIN - FUNCTIONAL ASSESSMENT: PAIN_FUNCTIONAL_ASSESSMENT: 0-10

## 2022-07-01 ASSESSMENT — PAIN DESCRIPTION - DESCRIPTORS
DESCRIPTORS: ACHING
DESCRIPTORS: ACHING

## 2022-07-01 NOTE — OP NOTE
Operative Note      Patient: Ynes Villalpando  YOB: 1985  MRN: [id number]    Date of Procedure: 7/1/2022    Preoperative diagnosis: Chronic cholecystitis    Postoperative diagnosis: Same    Procedure: Robotic cholecystectomy    Surgeon: Dr. Dennis Ahn    Asst.: YVONNE Tobar    Anesthesia: General    Preparation: Chloraprep    EBL: Less than 25 mL    Specimen: Gallbladder    Procedure: Informed consent was obtained. Preoperative antibiotic was given. Patient was taken to the operating room. General anesthesia was given. Abdomen was prepped and draped in usual sterile fashion. Timeout was done. Subumbilical incision was made. Ajith Border port was introduced using the open technique without any difficulty. CO2 insufflation was carried out. Scope was introduced. Patient was placed in a reverse Trendelenburg position. 3 additional ports were placed in usual fashion. Robot was brought in. All the ports were docked in usual fashion. Camera and the ancillary instruments were advanced towards the target anatomy. Assistant grabbed the fundus of the gallbladder and that was retracted anterosuperiorly. Careful dissection was continued in the triangle of calot. Cystic duct was isolated was skeletonized. Cystic artery was isolated was skeletonized. Critical view was obtained. Anatomy was confirmed. After confirming the anatomy cystic duct was clipped and divided. Cystic artery was clipped and divided. Gallbladder was peeled off the liver bed using the hook cautery. Complete hemostasis was confirmed. All the clips were found to be intact. At this point instruments were removed and the robot was undocked. Gallbladder was retrieved and sent to pathology for further evaluation. All the port sites are visualized. No bleeding noted. All the ports were removed. Fascia and the skin was approximated in the usual fashion. Local anesthetic was infiltrated. Dermabond was applied. Steri-Strips applied. Sterile dressing was applied. Patient tolerated procedure well and was transferred to the recovery room in stable condition.    -  Recommendations: Operative findings discussed with the family. Postoperative course recovery restrictions follow-up were all discussed. Prescriptions called in. Discharge instructions in the chart.

## 2022-07-01 NOTE — ANESTHESIA POSTPROCEDURE EVALUATION
POST- ANESTHESIA EVALUATION       Pt Name: Jose Carlos Mcrae  MRN: [id number]  [place]: 1985  Date of evaluation: 7/1/2022  Time:  2:11 PM      /70   Pulse 77   Temp 98.8 °F (37.1 °C) (Infrared)   Resp (!) 7   Ht 5' 3\" (1.6 m)   Wt 150 lb (68 kg)   LMP 06/17/2022 (Exact Date)   SpO2 98%   BMI 26.57 kg/m²      Consciousness Level  Awake  Cardiopulmonary Status  Stable  Pain Adequately Treated YES  Nausea / Vomiting  NO  Adequate Hydration  YES  Anesthesia Related Complications NONE      Electronically signed by Loretta Gale MD on 7/1/2022 at 2:11 PM       Department of Anesthesiology  Postprocedure Note    Patient: Jose Carlos Mcrae  MRN: [id number]  YOB: 1985  Date of evaluation: 7/1/2022      Procedure Summary     Date: 07/01/22 Room / Location: [address] [place] [address]   / [facility]: [facility]    Anesthesia Start: 9999 Anesthesia Stop: 1846    Procedure: CHOLECYSTECTOMY LAPAROSCOPIC ROBOTIC XI (N/A Abdomen) Diagnosis:       Gall bladder disease      (GALL BLADDER DISEASE)    Surgeons: Brett Kurtz MD Responsible Provider: Monica Mckeon MD    Anesthesia Type: general ASA Status: 3          Anesthesia Type: No value filed.     Laurie Phase I: Laurie Score: 8    Laurie Phase II:        Anesthesia Post Evaluation

## 2022-07-01 NOTE — ANESTHESIA PRE PROCEDURE
Department of Anesthesiology  Preprocedure Note       Name:  Osei Nicole   Age:  40 y.o.  :  1985                                          MRN:  [id number]         Date:  2022      Surgeon: Lisset Bhatia):  Lul Mccray MD    Procedure: Procedure(s):  CHOLECYSTECTOMY LAPAROSCOPIC ROBOTIC XI    Medications prior to admission:   Prior to Admission medications    Medication Sig Start Date End Date Taking? Authorizing Provider   clonazePAM (KLONOPIN) 0.5 MG tablet Take 0.5 mg by mouth 3 times daily as needed. Historical Provider, MD   pantoprazole (PROTONIX) 40 MG tablet Take 1 tablet by mouth 2 times daily (before meals) 22   Mariajose Pain, APRN - NP   ibuprofen (ADVIL;MOTRIN) 800 MG tablet Take 800 mg by mouth every 6 hours as needed for Pain    Historical Provider, MD   fluconazole (DIFLUCAN) 150 MG tablet Take 1 tablet 2 days after start antibiotic then take last tablet at end of antibiotic 3/14/22   [facility]   fluticasone [facility]) 50 MCG/ACT nasal spray 2 sprays by Nasal route daily 3/14/22   BALDO Sequeira - CNP   albuterol sulfate  (90 Base) MCG/ACT inhaler Inhale 2 puffs into the lungs every 6 hours as needed 22   Historical Provider, MD   Cholecalciferol 50 MCG (2000 UT) TABS Take 2,000 Units by mouth daily 21   Historical Provider, MD   Clobetasol Propionate 0.05 % SHAM Apply topically daily 21   Historical Provider, MD   gabapentin (NEURONTIN) 800 MG tablet Take 800 mg by mouth 2 times daily.   22   Historical Provider, MD   hydroCHLOROthiazide (HYDRODIURIL) 12.5 MG tablet Take 12.5 mg by mouth daily 22   Historical Provider, MD   omeprazole (PRILOSEC) 20 MG delayed release capsule Take 20 mg by mouth 2 times daily  22   Historical Provider, MD   FLUoxetine (PROZAC) 40 MG capsule Take 40 mg by mouth daily  22   Historical Provider, MD   ondansetron (ZOFRAN ODT) 4 MG disintegrating tablet Take 1 tablet by mouth every 8 hours as needed for Nausea 22   Terrial Scott, DO   butalbital-APAP-caffeine -40 MG CAPS per capsule Take 1 capsule by mouth every 4 hours as needed for Headaches    Historical Provider, MD   valACYclovir (VALTREX) 1 g tablet Take 1 tablet by mouth daily 10/4/21   BALDO Angel - CNP   gabapentin (NEURONTIN) 600 MG tablet Take 600 mg by mouth nightly. Historical Provider, MD       Current medications:    Current Facility-Administered Medications   Medication Dose Route Frequency Provider Last Rate Last Admin    ceFAZolin (ANCEF) 2000 mg in dextrose 5 % 50 mL IVPB  2,000 mg IntraVENous Once Cristiana Almaraz MD        lidocaine PF 1 % injection 1 mL  1 mL IntraDERmal Once PRN Hayley Reynoso MD        lactated ringers infusion   IntraVENous Continuous Hayley Reynoso MD        sodium chloride flush 0.9 % injection 5-40 mL  5-40 mL IntraVENous 2 times per day Hayley Reynoso MD        sodium chloride flush 0.9 % injection 5-40 mL  5-40 mL IntraVENous PRN Hayley Reynoso MD        0.9 % sodium chloride infusion   IntraVENous PRN Hayley Reynoso MD        scopolamine (TRANSDERM-SCOP) transdermal patch 1 patch  1 patch TransDERmal Q72H Hayley Reynoso MD           Allergies: Allergies   Allergen Reactions    Latex Swelling    Codeine Itching       Problem List:    Patient Active Problem List   Diagnosis Code    Anxiety F41.9    Asthma J45.909    Benzodiazepine abuse ([address] F13.10     14.  F Apg 8/9 Wt 8#15 Z39.2    ASCUS with positive high risk HPV cervical R87.610, R87.810    Chronic nonintractable headache R51.9, G89.29    Herpes simplex vulvovaginitis A60.04    Moderate episode of recurrent major depressive disorder (HCC) F33.1    Nonrheumatic mitral (valve) insufficiency I34.0    Nonrheumatic tricuspid valve regurgitation I36.1    Palpitations R00.2    Pulmonary hypertension (HCC) I27.20    BRE (obstructive sleep apnea) G47.33    COVID-19 U07.1    Dermatitis, unspecified L30.9    Essential (primary) hypertension I10    Female pelvic congestion syndrome N94.89    Gastro-esophageal reflux disease without esophagitis K21.9    Labial varicosities I86.3    Major depressive disorder, single episode, unspecified F32.9    Migraine, unspecified, not intractable, without status migrainosus G43.909    Unspecified atrial fibrillation (HCC) I48.91    Urinary frequency R35.0    Urinary incontinence R32    Varicose veins of leg with swelling, bilateral I83.893    Venous (peripheral) insufficiency I87.2    Wheezing R06.2    Preop examination Z01.818       Past Medical History:        Diagnosis Date    Anxiety     Asthma     Bladder prolapse, female, acquired     Body piercing     nose, rt.ear.     COVID-19 1/14/2022    Essential (primary) hypertension 1/14/2022    GERD (gastroesophageal reflux disease)     History of arterial disease of lower extremity     HSV (herpes simplex virus) anogenital infection 12/26/2017    Migraines     Mitral valve prolapse 2005    with regurg, has echo q 6 months, premed with dental visits    Nonrheumatic mitral (valve) insufficiency 8/15/2018    Nonrheumatic tricuspid valve regurgitation 8/15/2018    BRE (obstructive sleep apnea)     Ovary anomaly     reflux    Palpitations 8/15/2018    Pelvic congestive syndrome     PONV (postoperative nausea and vomiting)     POTS (postural orthostatic tachycardia syndrome) 2012    Pulmonary hypertension ([address]     relates \"pulmologist [facility] stated she didn't believe she had \"    Systemic sclerosis ([address] 05/2022    been going to [facility] for this     Unspecified atrial fibrillation ([facility] 1/14/2022    Venous insufficiency        Past Surgical History:        Procedure Laterality Date    BREAST BIOPSY      BREAST ENHANCEMENT SURGERY Bilateral 2008    removed in 2011   Matthew Arian Bilateral 2011    COLPOSCOPY  06/20/2016    Dr. Casey Gonsalves DO- Vascular: negative vascular ROS. Other Findings:           Anesthesia Plan      general     ASA 3       Induction: intravenous. MIPS: Postoperative opioids intended and Prophylactic antiemetics administered. Anesthetic plan and risks discussed with patient. Plan discussed with CRNA.             scopolamine patch already applied in pre op for h/o PONV  Pepcid IVP ordered         Jana Fortune MD   7/1/2022

## 2022-07-06 LAB — SURGICAL PATHOLOGY REPORT: NORMAL

## 2022-07-06 NOTE — H&P
Procedure History and Physical    Pre-Procedural Diagnosis: Chronic cholecystitis    Indications:  same    Procedure Planned: Robotic cholecystectomy    History Obtained From:  patient    HISTORY OF PRESENT ILLNESS:       The patient is a 40 y.o. female who presents for the above procedure. Past Medical History:    Past Medical History:   Diagnosis Date    Anxiety     Asthma     Bladder prolapse, female, acquired     Body piercing     nose, rt.ear.     COVID-19 1/14/2022    Essential (primary) hypertension 1/14/2022    GERD (gastroesophageal reflux disease)     History of arterial disease of lower extremity     HSV (herpes simplex virus) anogenital infection 12/26/2017    Migraines     Mitral valve prolapse 2005    with regurg, has echo q 6 months, premed with dental visits    Nonrheumatic mitral (valve) insufficiency 8/15/2018    Nonrheumatic tricuspid valve regurgitation 8/15/2018    BRE (obstructive sleep apnea)     Ovary anomaly     reflux    Palpitations 8/15/2018    Pelvic congestive syndrome     PONV (postoperative nausea and vomiting)     POTS (postural orthostatic tachycardia syndrome) 2012    Pulmonary hypertension ([address]     relates \"pulmologist at [facility] stated she didn't believe she had \"    Systemic sclerosis ([address] 05/2022    been going to [facility] for this     Unspecified atrial fibrillation ([address] 1/14/2022    Venous insufficiency        Past Surgical History:    Past Surgical History:   Procedure Laterality Date    BREAST BIOPSY      BREAST ENHANCEMENT SURGERY Bilateral 2008    removed in 2011   Matthew Don Bilateral 2011    CHOLECYSTECTOMY, LAPAROSCOPIC N/A 7/1/2022    CHOLECYSTECTOMY LAPAROSCOPIC ROBOTIC XI performed by Faye Maxwell MD at [facility]  06/20/2016    Dr. Rachel Varghese Bilateral 2011    PELVIC ARTERY EMBOLIZATION Bilateral     coil embolization both left and right ovarian veins \"(16 stents in place)\"    TUBAL LIGATION      2015 at [address] N/A 2/15/2022    EGD ESOPHAGOGASTRODUODENOSCOPY DILATATION AND STOMACH BIOPSY AND ESOPHAGUS performed by Dyana Guzman MD at [address]         Medications:  No current facility-administered medications for this encounter. Current Outpatient Medications   Medication Sig Dispense Refill    cephALEXin (KEFLEX) 500 MG capsule 500 mgTake three times daily 21 capsule 0    ondansetron (ZOFRAN) 4 MG tablet Take every six hours as needed 20 tablet 0    oxyCODONE-acetaminophen (PERCOCET) 5-325 MG per tablet Take 1 tablet by mouth every 6 hours as needed for Pain for up to 7 days. . Take lowest dose possible to manage pain 28 tablet 0    clonazePAM (KLONOPIN) 0.5 MG tablet Take 0.5 mg by mouth 3 times daily as needed.  pantoprazole (PROTONIX) 40 MG tablet Take 1 tablet by mouth 2 times daily (before meals) 180 tablet 1    ibuprofen (ADVIL;MOTRIN) 800 MG tablet Take 800 mg by mouth every 6 hours as needed for Pain      fluconazole (DIFLUCAN) 150 MG tablet Take 1 tablet 2 days after start antibiotic then take last tablet at end of antibiotic 2 tablet 0    fluticasone (FLONASE) 50 MCG/ACT nasal spray 2 sprays by Nasal route daily 16 g 0    albuterol sulfate  (90 Base) MCG/ACT inhaler Inhale 2 puffs into the lungs every 6 hours as needed      Cholecalciferol 50 MCG (2000 UT) TABS Take 2,000 Units by mouth daily      Clobetasol Propionate 0.05 % SHAM Apply topically daily      gabapentin (NEURONTIN) 800 MG tablet Take 800 mg by mouth 2 times daily.        hydroCHLOROthiazide (HYDRODIURIL) 12.5 MG tablet Take 12.5 mg by mouth daily      omeprazole (PRILOSEC) 20 MG delayed release capsule Take 20 mg by mouth 2 times daily       FLUoxetine (PROZAC) 40 MG capsule Take 40 mg by mouth daily       ondansetron (ZOFRAN ODT) 4 MG disintegrating tablet Take 1 tablet by mouth every 8 hours as needed for Nausea 20 tablet 0    butalbital-APAP-caffeine -40 MG CAPS per capsule Take 1 capsule by mouth every 4 hours as needed for Headaches      valACYclovir (VALTREX) 1 g tablet Take 1 tablet by mouth daily 30 tablet 0    gabapentin (NEURONTIN) 600 MG tablet Take 600 mg by mouth nightly. Allergies: Allergies   Allergen Reactions    Latex Swelling    Codeine Itching               Problems with Sedation/Anesthesia in the past? no    REVIEW OF SYSTEMS:  12 point review of systems negative other than mentioned above. PHYSICAL EXAM:    Vitals:  /70   Pulse 63   Temp 97.1 °F (36.2 °C) (Infrared)   Resp 16   Ht 5' 3\" (1.6 m)   Wt 150 lb (68 kg)   LMP 06/17/2022 (Exact Date)   SpO2 97%   BMI 26.57 kg/m²     Focused Exam related to procedure:    General appearance: NAD, conversant   Eyes: anicteric sclerae, moist conjunctivae; no lid-lag; PERRLA   Lungs: CTA, with normal respiratory effort and no intercostal retractions   CV: RRR, no MRGs   Abdomen: Soft, non-tender; no masses or HSM   Skin: Normal temperature, turgor and texture; no rash, ulcers or subcutaneous nodules     DATA:  CBC:   Lab Results   Component Value Date    WBC 5.9 06/30/2022    HGB 11.9 (L) 06/30/2022    HCT 34.1 (L) 06/30/2022    MCV 85.4 06/30/2022     06/30/2022     BUN/Cr:   Lab Results   Component Value Date    BUN 9 06/30/2022   ,   Lab Results   Component Value Date    CREATININE 0.68 06/30/2022     Potassium:   Lab Results   Component Value Date    K 5.0 06/30/2022     PT/INR: No results found for: INR, PROTIME    ASSESSMENT AND PLAN:     Chronic cholecystitis for robotic cholecystectomy    1. Patient is a 40 y.o. female with above specified procedure planned. Expected Sedation/Anesthesia Type: General    2.   ASA ([address] Anesthesiology) Anesthesia Status: Class 3 - A patient with severe systemic disease that limits activity but is not incapacitating    3. ASA Physical Status Classification: ASA 3 - Patient with moderate systemic disease with functional limitations    4. Procedure options, risks and benefits reviewed with Patient. Patient expresses understanding.     5.  Consent has been signed:  Yes    Leonarda Munoz MD

## 2022-07-30 PROBLEM — Z01.818 PREOP EXAMINATION: Status: RESOLVED | Noted: 2022-06-30 | Resolved: 2022-07-30

## 2022-08-10 RX ORDER — VALACYCLOVIR HYDROCHLORIDE 1 G/1
1000 TABLET, FILM COATED ORAL DAILY
Qty: 30 TABLET | Refills: 0 | Status: SHIPPED | OUTPATIENT
Start: 2022-08-10

## 2022-09-27 NOTE — TELEPHONE ENCOUNTER
Has she been taking slynd?   She never returned for follow up and looks like June was d/c because she wasn't taking it

## 2022-09-30 RX ORDER — DROSPIRENONE 4 MG/1
TABLET, FILM COATED ORAL
Qty: 84 TABLET | Refills: 0 | OUTPATIENT
Start: 2022-09-30

## 2022-10-25 ENCOUNTER — PATIENT MESSAGE (OUTPATIENT)
Dept: GASTROENTEROLOGY | Age: 37
End: 2022-10-25

## 2022-11-01 ENCOUNTER — OFFICE VISIT (OUTPATIENT)
Dept: OBGYN CLINIC | Age: 37
End: 2022-11-01
Payer: MEDICARE

## 2022-11-01 VITALS
BODY MASS INDEX: 28.35 KG/M2 | SYSTOLIC BLOOD PRESSURE: 120 MMHG | WEIGHT: 160 LBS | DIASTOLIC BLOOD PRESSURE: 78 MMHG | HEIGHT: 63 IN

## 2022-11-01 DIAGNOSIS — R45.89 CRYING: ICD-10-CM

## 2022-11-01 DIAGNOSIS — R63.5 WEIGHT GAIN: ICD-10-CM

## 2022-11-01 DIAGNOSIS — E89.41 HOT FLASHES DUE TO SURGICAL MENOPAUSE: Primary | ICD-10-CM

## 2022-11-01 DIAGNOSIS — G47.00 INSOMNIA, UNSPECIFIED TYPE: ICD-10-CM

## 2022-11-01 PROBLEM — I27.20 PULMONARY HYPERTENSION (HCC): Status: RESOLVED | Noted: 2017-07-14 | Resolved: 2022-11-01

## 2022-11-01 PROCEDURE — 3074F SYST BP LT 130 MM HG: CPT | Performed by: NURSE PRACTITIONER

## 2022-11-01 PROCEDURE — G8484 FLU IMMUNIZE NO ADMIN: HCPCS | Performed by: NURSE PRACTITIONER

## 2022-11-01 PROCEDURE — 99214 OFFICE O/P EST MOD 30 MIN: CPT | Performed by: NURSE PRACTITIONER

## 2022-11-01 PROCEDURE — 1036F TOBACCO NON-USER: CPT | Performed by: NURSE PRACTITIONER

## 2022-11-01 PROCEDURE — G8419 CALC BMI OUT NRM PARAM NOF/U: HCPCS | Performed by: NURSE PRACTITIONER

## 2022-11-01 PROCEDURE — G8427 DOCREV CUR MEDS BY ELIG CLIN: HCPCS | Performed by: NURSE PRACTITIONER

## 2022-11-01 PROCEDURE — 3078F DIAST BP <80 MM HG: CPT | Performed by: NURSE PRACTITIONER

## 2022-11-01 RX ORDER — CARIPRAZINE 3 MG/1
CAPSULE, GELATIN COATED ORAL
COMMUNITY
Start: 2022-10-20

## 2022-11-01 NOTE — PROGRESS NOTES
Subjective:    Hormone Replacement Counseling: Patient presents to discuss hormone replacement therapy. Patient is requesting hormone replacement therapy due to hot flashes, insomnia, moodiness, no energy, and weight gain. The patient is not taking hormone replacement therapy. Patient denies post-menopausal vaginal bleeding. The patient is sexually active. last pap: was normal. Patient is hormone deficient due to hysterectomy with BSO, which occurred 6 weeks ago . The patient currently has symptoms of anxiety, hot flashes, insomnia, moodiness, no energy. She has not been sexually active since HYST 6 weeks ago. Current hormone therapy:   none  Previous hormone therapy:   OCPs    She does have hx of HTN- she is on antihypertensive medications and BP is well controlled. She has hx MVP follows with cardiology yearly and has had echocardiogram in May 2022 that was normal.   She denies hx of atrial fibrillation that was previously noted in chart she admits to history of POTS, last EKG in May 2022 was normal sinus rhythm. She is not on any anticoagulants. She did complete testing for May Turners Syndrome given hx PCS and it was negative she states. Review of systems per HPI, otherwise negative. Allergies; medications; past medical, surgical, family, and social history; and problem list reviewed as indicated in this encounter. Objective:  Vitals:  Blood pressure 120/78, height 5' 3\" (1.6 m), weight 160 lb (72.6 kg), not currently breastfeeding. Constitutional: She is oriented to person, place, and time. She appears well-developed and well-nourished and in no acute distress. Cardiovascular: Normal rate and regular rhythm, no murmur, rub, or gallop    Pulmonary/Chest: Effort normal and breath sounds normal. No rales or wheezes. No chest retraction.   Extremities: no clubbing, cyanosis, or edema  Neurological:  CN II - XII grossly intact; no focal neurological deficits  Psychiatric:  Well groomed, well dressed. The patient maintains appropriate eye contact and does not appear to be responding to internal stimuli. No agitation    Assessment/ Plan / Medical Decision Making   Diagnosis Orders   1. Hot flashes due to surgical menopause  estradiol (CLIMARA) 0.025 MG/24HR      2. Insomnia, unspecified type        3. Weight gain        4. Crying                Medications, laboratory testing, imaging, consultation, and follow up as documented in this encounter. Postmenopausal symptoms:   Reviewed options for tx postmenopausal symptoms:   OTC supplements, menopausal dietindicated. Medications:     Oral estrogen,   Estrogen transdermal/topical  Compound HRT    She is interested in : Transdermal estrogen       Discussed she is at increased risk of breast cancer, cardiac events, strokes and blood clots. Does want to startHRT at this time, she signed consent for HRT. Val received counseling on the following healthy behaviors: medication adherence    Patient given educational materials on HRT    Was a self-tracking handout given in paper form or via "[employer]hart? No  If yes, see orders or list here. Discussed use, benefit, and side effects of prescribed medications. Barriers to medication compliance addressed. All patient questions answered. Pt voiced understanding. This note is created with the assistance of a speech-recognition program.  While intending to generate a document that actually reflects the content of the visit, no guarantees can be provided that every mistake has been identified and corrected by editing. Of the 30 minute duration appointment visit, Gloriajean Soulier CNP spent at least 50% of the face-to-face time in counseling, explanation of diagnosis, planning of further management, and answering all questions.

## 2022-12-15 ENCOUNTER — TELEPHONE (OUTPATIENT)
Dept: OBGYN CLINIC | Age: 37
End: 2022-12-15

## 2022-12-15 RX ORDER — ESTRADIOL 0.5 MG/1
0.5 TABLET ORAL DAILY
Qty: 30 TABLET | Refills: 0 | Status: SHIPPED | OUTPATIENT
Start: 2022-12-15 | End: 2023-01-11 | Stop reason: SDUPTHER

## 2022-12-15 NOTE — TELEPHONE ENCOUNTER
Yes but she needs to schedule a follow up appt in 4 weeks and I will send 30 day supply only she was supposed to return after started patch in 4- weeks and never made f/u appt she has to have follow ups with hormone therapy and if she just had surgery she needs to ask them when she can restart hormone therapy

## 2022-12-15 NOTE — TELEPHONE ENCOUNTER
Radha Davis left a message stating she had surgery last week and they took her climara patch off. She also lost one in the shower, they are not sticking very well. She has now missed 2 weeks. Can we call her in the pill insteady?     She is on climara 0.025 mg  Last seen 11/1/22

## 2022-12-28 RX ORDER — VALACYCLOVIR HYDROCHLORIDE 1 G/1
1000 TABLET, FILM COATED ORAL DAILY
Qty: 30 TABLET | Refills: 0 | Status: SHIPPED | OUTPATIENT
Start: 2022-12-28

## 2023-01-09 NOTE — TELEPHONE ENCOUNTER
See my note from 12/15/22 she has not scheduled follow up please call to schedule follow up once she has f/u scheduled then I can refill med for month

## 2023-01-11 ENCOUNTER — OFFICE VISIT (OUTPATIENT)
Dept: OBGYN CLINIC | Age: 38
End: 2023-01-11
Payer: MEDICARE

## 2023-01-11 VITALS
DIASTOLIC BLOOD PRESSURE: 64 MMHG | SYSTOLIC BLOOD PRESSURE: 122 MMHG | WEIGHT: 163.25 LBS | BODY MASS INDEX: 28.93 KG/M2 | HEIGHT: 63 IN

## 2023-01-11 DIAGNOSIS — R63.5 WEIGHT GAIN: ICD-10-CM

## 2023-01-11 DIAGNOSIS — E89.41 HOT FLASHES DUE TO SURGICAL MENOPAUSE: Primary | ICD-10-CM

## 2023-01-11 DIAGNOSIS — I10 ESSENTIAL (PRIMARY) HYPERTENSION: ICD-10-CM

## 2023-01-11 DIAGNOSIS — G47.00 INSOMNIA, UNSPECIFIED TYPE: ICD-10-CM

## 2023-01-11 PROCEDURE — G8427 DOCREV CUR MEDS BY ELIG CLIN: HCPCS | Performed by: NURSE PRACTITIONER

## 2023-01-11 PROCEDURE — 3078F DIAST BP <80 MM HG: CPT | Performed by: NURSE PRACTITIONER

## 2023-01-11 PROCEDURE — G8419 CALC BMI OUT NRM PARAM NOF/U: HCPCS | Performed by: NURSE PRACTITIONER

## 2023-01-11 PROCEDURE — G8484 FLU IMMUNIZE NO ADMIN: HCPCS | Performed by: NURSE PRACTITIONER

## 2023-01-11 PROCEDURE — 99214 OFFICE O/P EST MOD 30 MIN: CPT | Performed by: NURSE PRACTITIONER

## 2023-01-11 PROCEDURE — 3074F SYST BP LT 130 MM HG: CPT | Performed by: NURSE PRACTITIONER

## 2023-01-11 PROCEDURE — 1036F TOBACCO NON-USER: CPT | Performed by: NURSE PRACTITIONER

## 2023-01-11 RX ORDER — ESTRADIOL 1 MG/1
1 TABLET ORAL DAILY
Qty: 30 TABLET | Refills: 2 | Status: SHIPPED | OUTPATIENT
Start: 2023-01-11

## 2023-01-11 NOTE — PROGRESS NOTES
Subjective:  Kirstin Latif is in for for follow up and continued evaluation and management or HRT therapy. Kirstin Latif presented 11/1/22 with complaints of   hot flashes, insomnia, moodiness, anxiety,  no energy, and weight gain. Patient is hormone deficient due to hysterectomy with BSO, which occurred in October 2022. Hx of HTN controlled with medication has a cardiologist she follows with. She did complete testing for May Turners Syndrome given hx PCS and it was negative she states. She was originally started on transdermal HRT but stated kept falling off so requested to switch to oral Estrace. Estrace 0.5mg script was sent in 12/15/22. She states has noted improvement I symptoms overall less hot flashes, still some but better, still insomnia but not always as significant, moodiness and energy somewhat improving also. She is interested though in increasing estrace to 1mg. She denies chest pain, S.O.B., headaches, calf tenderness, DVT prevention reviewed. Risk of estrogen Breast cancer, and DVT reviewed patient aware of risk . Review of systems per HPI, otherwise negative. Allergies; medications; past medical, surgical, family, and social history; and problem list reviewed as indicated in this encounter. Objective:  Vitals:  Blood pressure 122/64, height 5' 3\" (1.6 m), weight 163 lb 4 oz (74 kg), last menstrual period 06/17/2022, not currently breastfeeding. Constitutional: She is oriented to person, place, and time. She appears well-developed and well-nourished and in no acute distress. Cardiovascular: Normal rate and regular rhythm, no murmur, rub, or gallop    Pulmonary/Chest: Effort normal and breath sounds normal. No rales or wheezes. No chest retraction. Extremities: no clubbing, cyanosis, or edema  Neurological:  CN II - XII grossly intact; no focal neurological deficits  Psychiatric:  Well groomed, well dressed.   The patient maintains appropriate eye contact and does not appear to be responding to internal stimuli. No agitation    Assessment/ Plan / Medical Decision Making   Diagnosis Orders   1. Hot flashes due to surgical menopause  estradiol (ESTRACE) 1 MG tablet      2. Insomnia, unspecified type  estradiol (ESTRACE) 1 MG tablet      3. Weight gain        4. Essential (primary) hypertension                Medications, laboratory testing, imaging, consultation, and follow up as documented in this encounter. Hot flashes/HRT  She overall is doing better but would like to increase as still noting insomnia and hot flashes. She has hx of HTN but well controlled on medications. On HRT-Discussed she is at increased risk of breast cancer, cardiac events, strokes and blood clots. Does not want to stop HRT at this time, wants to increase, discussed risks with hx HTN,  she signed consent for HRT on 11/1/22      F/u- 4-6 weeks med check can do VV if check BP on video    Val received counseling on the following healthy behaviors: nutrition, exercise, and medication adherence    Patient given educational materials on estrace    Was a self-tracking handout given in paper form or via [employer]? No  If yes, see orders or list here. Discussed use, benefit, and side effects of prescribed medications. Barriers to medication compliance addressed. All patient questions answered. Pt voiced understanding. This note is created with the assistance of a speech-recognition program.  While intending to generate a document that actually reflects the content of the visit, no guarantees can be provided that every mistake has been identified and corrected by editing.

## 2023-01-18 RX ORDER — ESTRADIOL 0.5 MG/1
0.5 TABLET ORAL DAILY
Qty: 30 TABLET | Refills: 0 | OUTPATIENT
Start: 2023-01-18

## 2023-02-13 ENCOUNTER — E-VISIT (OUTPATIENT)
Dept: PRIMARY CARE CLINIC | Age: 38
End: 2023-02-13
Payer: MEDICAID

## 2023-02-13 DIAGNOSIS — J06.9 UPPER RESPIRATORY TRACT INFECTION, UNSPECIFIED TYPE: Primary | ICD-10-CM

## 2023-02-13 PROCEDURE — 99422 OL DIG E/M SVC 11-20 MIN: CPT | Performed by: NURSE PRACTITIONER

## 2023-02-13 RX ORDER — AMOXICILLIN AND CLAVULANATE POTASSIUM 875; 125 MG/1; MG/1
1 TABLET, FILM COATED ORAL 2 TIMES DAILY
Qty: 20 TABLET | Refills: 0 | Status: SHIPPED | OUTPATIENT
Start: 2023-02-13 | End: 2023-02-23

## 2023-02-13 RX ORDER — FLUCONAZOLE 150 MG/1
150 TABLET ORAL ONCE
Qty: 1 TABLET | Refills: 1 | Status: SHIPPED | OUTPATIENT
Start: 2023-02-13 | End: 2023-02-13

## 2023-02-13 ASSESSMENT — LIFESTYLE VARIABLES: SMOKING_STATUS: NO, I'VE NEVER SMOKED

## 2023-02-14 NOTE — PROGRESS NOTES
Reviewed questionnaire     Reviewed meds/allergies    Dx URI    Plan Rx given for augmentin and diflucan, follow up with PCP if no improvement    Time spent on visit 13 min

## 2023-03-09 ENCOUNTER — TELEPHONE (OUTPATIENT)
Dept: OBGYN CLINIC | Age: 38
End: 2023-03-09

## 2023-05-13 DIAGNOSIS — G47.00 INSOMNIA, UNSPECIFIED TYPE: ICD-10-CM

## 2023-05-13 DIAGNOSIS — E89.41 HOT FLASHES DUE TO SURGICAL MENOPAUSE: ICD-10-CM

## 2023-05-17 ENCOUNTER — OFFICE VISIT (OUTPATIENT)
Dept: OBGYN CLINIC | Age: 38
End: 2023-05-17
Payer: MEDICAID

## 2023-05-17 VITALS — BODY MASS INDEX: 26.22 KG/M2 | SYSTOLIC BLOOD PRESSURE: 108 MMHG | DIASTOLIC BLOOD PRESSURE: 72 MMHG | WEIGHT: 148 LBS

## 2023-05-17 DIAGNOSIS — G47.00 INSOMNIA, UNSPECIFIED TYPE: ICD-10-CM

## 2023-05-17 DIAGNOSIS — E89.40 SURGICAL MENOPAUSE: ICD-10-CM

## 2023-05-17 DIAGNOSIS — I10 ESSENTIAL (PRIMARY) HYPERTENSION: ICD-10-CM

## 2023-05-17 DIAGNOSIS — N89.8 VAGINAL DRYNESS: ICD-10-CM

## 2023-05-17 DIAGNOSIS — E89.41 HOT FLASHES DUE TO SURGICAL MENOPAUSE: Primary | ICD-10-CM

## 2023-05-17 DIAGNOSIS — Z79.899 MEDICATION MANAGEMENT: ICD-10-CM

## 2023-05-17 PROCEDURE — 3078F DIAST BP <80 MM HG: CPT | Performed by: NURSE PRACTITIONER

## 2023-05-17 PROCEDURE — 3074F SYST BP LT 130 MM HG: CPT | Performed by: NURSE PRACTITIONER

## 2023-05-17 PROCEDURE — 99214 OFFICE O/P EST MOD 30 MIN: CPT | Performed by: NURSE PRACTITIONER

## 2023-05-17 RX ORDER — ESTRADIOL 1 MG/1
1.5 TABLET ORAL DAILY
Qty: 45 TABLET | Refills: 2 | Status: SHIPPED | OUTPATIENT
Start: 2023-05-17

## 2023-05-17 RX ORDER — ESTRADIOL 0.1 MG/G
CREAM VAGINAL
Qty: 42.5 G | Refills: 3 | Status: SHIPPED | OUTPATIENT
Start: 2023-05-17

## 2023-05-17 NOTE — PROGRESS NOTES
Subjective:    Sharona Salgado presents today for follow up on HRT. Sharona Salgado presented originally on  11/1/22 with complaints of   hot flashes, insomnia, moodiness, anxiety,  no energy, and weight gain. Patient is hormone deficient due to hysterectomy with BSO, which occurred in October 2022. Hx of HTN controlled with medication has a cardiologist she follows with. She did complete testing for May Turners Syndrome given hx PCS and it was negative she states. She was originally started on transdermal HRT but stated kept falling off so requested to switch to oral Estrace. Estrace 0.5mg script was sent in 12/15/22. She  had noted at her follow up on 1/11/23 improvement in  symptoms overall less hot flashes, still some but better, still insomnia but not always as significant, moodiness and energy somewhat improving also. She wanted to increase estrace though  to 1mg. So that was done in January 2023. She states for couple months her symptoms improved even more until just recently she is noting increase hot flashes and insomnia symptoms and now concerned about vaginal dryness causing dyspareunia, denies vaginal discharge, odor, or itching or bleeding. She denies chest pain, S.O.B., headaches, calf tenderness, DVT prevention reviewed. Risk of estrogen Breast cancer, and DVT reviewed patient aware of risk . Review of systems per HPI, otherwise negative. Allergies; medications; past medical, surgical, family, and social history; and problem list reviewed as indicated in this encounter. Objective:  Vitals:  Blood pressure 108/72, weight 148 lb (67.1 kg), last menstrual period 06/17/2022, not currently breastfeeding. Constitutional: She is oriented to person, place, and time. She appears well-developed and well-nourished and in no acute distress.    Cardiovascular: Normal rate and regular rhythm, no murmur, rub, or gallop    Pulmonary/Chest: Effort normal and breath sounds normal. No rales or

## 2023-05-18 RX ORDER — ESTRADIOL 1 MG/1
1 TABLET ORAL DAILY
Qty: 30 TABLET | Refills: 0 | OUTPATIENT
Start: 2023-05-18

## 2023-07-21 RX ORDER — VALACYCLOVIR HYDROCHLORIDE 1 G/1
1000 TABLET, FILM COATED ORAL DAILY
Qty: 30 TABLET | Refills: 5 | Status: SHIPPED | OUTPATIENT
Start: 2023-07-21

## 2023-07-21 NOTE — TELEPHONE ENCOUNTER
From: Brittney Andersen  To:  Office of Lalo Bah  Sent: 7/20/2023 8:28 AM EDT  Subject: Medication Renewal Request    Refills have been requested for the following medications:     valACYclovir (VALTREX) 1 g tablet Lalo Bah, APRN - CNP]    Preferred pharmacy: [facility] [address] [place], [place] [phone] -  [phone]

## 2023-08-14 ENCOUNTER — HOSPITAL ENCOUNTER (OUTPATIENT)
Age: 38
Discharge: HOME | End: 2023-08-14

## 2023-08-14 LAB — RUBV IGG SERPL QL IA: 23.94 IU/ML

## 2023-08-14 PROCEDURE — 86481 TB AG RESPONSE T-CELL SUSP: CPT

## 2023-08-14 PROCEDURE — 36415 COLL VENOUS BLD VENIPUNCTURE: CPT

## 2023-08-14 PROCEDURE — 86735 MUMPS ANTIBODY: CPT

## 2023-08-14 PROCEDURE — 86765 RUBEOLA ANTIBODY: CPT

## 2023-08-14 PROCEDURE — 86762 RUBELLA ANTIBODY: CPT

## 2023-08-14 PROCEDURE — 86787 VARICELLA-ZOSTER ANTIBODY: CPT

## 2023-08-16 LAB
MEV IGG SER-ACNC: 1.16
MUV IGG SER IA-ACNC: 1.5
VZV IGG SER QL IA: 2.49

## 2023-08-17 LAB — T-SPOT TB TEST: NORMAL

## 2023-08-23 DIAGNOSIS — E89.41 HOT FLASHES DUE TO SURGICAL MENOPAUSE: ICD-10-CM

## 2023-08-23 DIAGNOSIS — G47.00 INSOMNIA, UNSPECIFIED TYPE: ICD-10-CM

## 2023-08-24 DIAGNOSIS — G47.00 INSOMNIA, UNSPECIFIED TYPE: ICD-10-CM

## 2023-08-24 DIAGNOSIS — E89.41 HOT FLASHES DUE TO SURGICAL MENOPAUSE: ICD-10-CM

## 2023-08-24 RX ORDER — ESTRADIOL 1 MG/1
1.5 TABLET ORAL DAILY
Qty: 45 TABLET | Refills: 1 | Status: SHIPPED | OUTPATIENT
Start: 2023-08-24

## 2023-08-24 RX ORDER — ESTRADIOL 1 MG/1
TABLET ORAL
Qty: 45 TABLET | Refills: 2 | OUTPATIENT
Start: 2023-08-24

## 2023-10-28 ENCOUNTER — HOSPITAL ENCOUNTER (EMERGENCY)
Age: 38
Discharge: HOME | End: 2023-10-28
Attending: EMERGENCY MEDICINE
Payer: COMMERCIAL

## 2023-10-28 VITALS
OXYGEN SATURATION: 99 % | TEMPERATURE: 97.7 F | SYSTOLIC BLOOD PRESSURE: 145 MMHG | BODY MASS INDEX: 23.91 KG/M2 | WEIGHT: 135 LBS | HEART RATE: 66 BPM | RESPIRATION RATE: 18 BRPM | DIASTOLIC BLOOD PRESSURE: 98 MMHG

## 2023-10-28 DIAGNOSIS — R11.2 NAUSEA AND VOMITING, UNSPECIFIED VOMITING TYPE: Primary | ICD-10-CM

## 2023-10-28 LAB
ALBUMIN SERPL-MCNC: 4.5 G/DL (ref 3.5–5.2)
ALP SERPL-CCNC: 70 U/L (ref 35–104)
ALT SERPL-CCNC: 11 U/L (ref 5–33)
ANION GAP SERPL CALCULATED.3IONS-SCNC: 12 MMOL/L (ref 9–17)
AST SERPL-CCNC: 14 U/L
BASOPHILS # BLD: 0 K/UL (ref 0–0.2)
BASOPHILS NFR BLD: 1 % (ref 0–2)
BILIRUB SERPL-MCNC: 0.2 MG/DL (ref 0.3–1.2)
BUN SERPL-MCNC: 14 MG/DL (ref 6–20)
CALCIUM SERPL-MCNC: 9.7 MG/DL (ref 8.6–10.4)
CHLORIDE SERPL-SCNC: 104 MMOL/L (ref 98–107)
CO2 SERPL-SCNC: 24 MMOL/L (ref 20–31)
CREAT SERPL-MCNC: 0.5 MG/DL (ref 0.5–0.9)
EOSINOPHIL # BLD: 0 K/UL (ref 0–0.4)
EOSINOPHILS RELATIVE PERCENT: 1 % (ref 0–4)
ERYTHROCYTE [DISTWIDTH] IN BLOOD BY AUTOMATED COUNT: 14 % (ref 11.5–14.9)
GFR SERPL CREATININE-BSD FRML MDRD: >60 ML/MIN/1.73M2
GLUCOSE SERPL-MCNC: 95 MG/DL (ref 70–99)
HCG SERPL QL: NEGATIVE
HCT VFR BLD AUTO: 38.4 % (ref 36–46)
HGB BLD-MCNC: 12.9 G/DL (ref 12–16)
LIPASE SERPL-CCNC: 17 U/L (ref 13–60)
LYMPHOCYTES NFR BLD: 0.9 K/UL (ref 1–4.8)
LYMPHOCYTES RELATIVE PERCENT: 22 % (ref 24–44)
MCH RBC QN AUTO: 29.3 PG (ref 26–34)
MCHC RBC AUTO-ENTMCNC: 33.6 G/DL (ref 31–37)
MCV RBC AUTO: 87.2 FL (ref 80–100)
MONOCYTES NFR BLD: 0.2 K/UL (ref 0.1–1.3)
MONOCYTES NFR BLD: 5 % (ref 1–7)
NEUTROPHILS NFR BLD: 71 % (ref 36–66)
NEUTS SEG NFR BLD: 2.9 K/UL (ref 1.3–9.1)
PLATELET # BLD AUTO: 223 K/UL (ref 150–450)
PMV BLD AUTO: 8.7 FL (ref 6–12)
POTASSIUM SERPL-SCNC: 4.4 MMOL/L (ref 3.7–5.3)
PROT SERPL-MCNC: 7.5 G/DL (ref 6.4–8.3)
RBC # BLD AUTO: 4.4 M/UL (ref 4–5.2)
SODIUM SERPL-SCNC: 140 MMOL/L (ref 135–144)
WBC OTHER # BLD: 4.1 K/UL (ref 3.5–11)

## 2023-10-28 PROCEDURE — 85025 COMPLETE CBC W/AUTO DIFF WBC: CPT

## 2023-10-28 PROCEDURE — 36415 COLL VENOUS BLD VENIPUNCTURE: CPT

## 2023-10-28 PROCEDURE — 99284 EMERGENCY DEPT VISIT MOD MDM: CPT

## 2023-10-28 PROCEDURE — 80053 COMPREHEN METABOLIC PANEL: CPT

## 2023-10-28 PROCEDURE — 6360000002 HC RX W HCPCS: Performed by: EMERGENCY MEDICINE

## 2023-10-28 PROCEDURE — 2500000003 HC RX 250 WO HCPCS: Performed by: EMERGENCY MEDICINE

## 2023-10-28 PROCEDURE — 83690 ASSAY OF LIPASE: CPT

## 2023-10-28 PROCEDURE — A4216 STERILE WATER/SALINE, 10 ML: HCPCS | Performed by: EMERGENCY MEDICINE

## 2023-10-28 PROCEDURE — 84703 CHORIONIC GONADOTROPIN ASSAY: CPT

## 2023-10-28 PROCEDURE — 96375 TX/PRO/DX INJ NEW DRUG ADDON: CPT

## 2023-10-28 PROCEDURE — 96374 THER/PROPH/DIAG INJ IV PUSH: CPT

## 2023-10-28 PROCEDURE — 2580000003 HC RX 258: Performed by: EMERGENCY MEDICINE

## 2023-10-28 RX ORDER — ONDANSETRON 2 MG/ML
8 INJECTION INTRAMUSCULAR; INTRAVENOUS ONCE
Status: COMPLETED | OUTPATIENT
Start: 2023-10-28 | End: 2023-10-28

## 2023-10-28 RX ORDER — ONDANSETRON 4 MG/1
4 TABLET, FILM COATED ORAL 3 TIMES DAILY PRN
Qty: 15 TABLET | Refills: 0 | Status: SHIPPED | OUTPATIENT
Start: 2023-10-28

## 2023-10-28 RX ORDER — 0.9 % SODIUM CHLORIDE 0.9 %
1000 INTRAVENOUS SOLUTION INTRAVENOUS ONCE
Status: COMPLETED | OUTPATIENT
Start: 2023-10-28 | End: 2023-10-28

## 2023-10-28 RX ADMIN — SODIUM CHLORIDE 1000 ML: 9 INJECTION, SOLUTION INTRAVENOUS at 18:57

## 2023-10-28 RX ADMIN — FAMOTIDINE 20 MG: 10 INJECTION, SOLUTION INTRAVENOUS at 19:00

## 2023-10-28 RX ADMIN — ONDANSETRON 8 MG: 2 INJECTION INTRAMUSCULAR; INTRAVENOUS at 18:58

## 2023-10-28 ASSESSMENT — ENCOUNTER SYMPTOMS
EYE REDNESS: 0
NAUSEA: 1
EYE PAIN: 0
DIARRHEA: 1
COUGH: 0
CONSTIPATION: 0
CHEST TIGHTNESS: 0
BACK PAIN: 0
SORE THROAT: 0
VOMITING: 1
SHORTNESS OF BREATH: 0
ABDOMINAL PAIN: 1

## 2023-10-28 ASSESSMENT — LIFESTYLE VARIABLES
HOW OFTEN DO YOU HAVE A DRINK CONTAINING ALCOHOL: NEVER
HOW MANY STANDARD DRINKS CONTAINING ALCOHOL DO YOU HAVE ON A TYPICAL DAY: PATIENT DOES NOT DRINK

## 2023-10-30 ENCOUNTER — TELEPHONE (OUTPATIENT)
Dept: OBGYN CLINIC | Age: 38
End: 2023-10-30

## 2023-10-31 DIAGNOSIS — G47.00 INSOMNIA, UNSPECIFIED TYPE: ICD-10-CM

## 2023-10-31 DIAGNOSIS — E89.41 HOT FLASHES DUE TO SURGICAL MENOPAUSE: ICD-10-CM

## 2023-11-02 ENCOUNTER — TELEPHONE (OUTPATIENT)
Dept: OBGYN CLINIC | Age: 38
End: 2023-11-02

## 2023-11-02 NOTE — TELEPHONE ENCOUNTER
Returned Pt call on 11/2 11:08a from a VM left on 11/1 4:28p to schedule an appt with [facility].

## 2023-11-07 ENCOUNTER — TELEPHONE (OUTPATIENT)
Dept: OBGYN CLINIC | Age: 38
End: 2023-11-07

## 2023-11-07 DIAGNOSIS — G47.00 INSOMNIA, UNSPECIFIED TYPE: ICD-10-CM

## 2023-11-07 DIAGNOSIS — E89.41 HOT FLASHES DUE TO SURGICAL MENOPAUSE: ICD-10-CM

## 2023-11-07 RX ORDER — ESTRADIOL 1 MG/1
1.5 TABLET ORAL DAILY
Qty: 45 TABLET | Refills: 0 | Status: SHIPPED | OUTPATIENT
Start: 2023-11-07 | End: 2023-11-20 | Stop reason: SDUPTHER

## 2023-11-07 RX ORDER — ESTRADIOL 1 MG/1
1.5 TABLET ORAL DAILY
Qty: 45 TABLET | Refills: 0 | OUTPATIENT
Start: 2023-11-07

## 2023-11-07 NOTE — TELEPHONE ENCOUNTER
Pt called office on 11/07/2023 at 1:13 pm.   Pt had been talked to day prior to schedule follow up appointment for med check for estrogen. Pt asked if a refill could be sent in to last through appointment. Pt was informed that Tasha Connor would be contacted and asked. Today at 8:00 am, Tasha Connor had informed me that the prescription had been sent into the pharmacy for the pt. During call today at 1:13 pm, pt told me that they had called on Thursday requesting a refill and that the staff member they had talked to told them that they would talk to Tasha Connor. Pt was referring to conversation from the day prior. I told the patient that I had talked to Tasha Connor at the end of the work day, and then this morning the refill had been sent in. Pt was informed that they needed to be seen at this appointment prior to receiving any further refills. Pt became upset and stated \"I work for [employer] and told me that she would call in a [employer] on the office.

## 2023-11-09 NOTE — TELEPHONE ENCOUNTER
Spoke with the patient regarding her being upset when she was on the phone the other day. The patient stated that she did not have any missed calls or voicemails from the office and she said that she does not log into [employer] frequently so she is not sure if she received the [employer]. I explained to her that Jeanne Puentes has specific guidelines as to when she wants a patient to come back in the office to be seen and in order to prevent medication from being stopped she could make her appt when she left the office. Pt was advised not to miss or no show to her appointment. Pt also reminded that she needs to be mindful on the phone with the staff. She did apologize for getting upset and asked me to tell them she was sorry. Pt also advised that if getting out of work is an issue that she could be seen virtually if she had a way to check her BP at home.  Pt voiced understanding. Bang Velásquez

## 2023-11-20 ENCOUNTER — OFFICE VISIT (OUTPATIENT)
Dept: OBGYN CLINIC | Age: 38
End: 2023-11-20
Payer: COMMERCIAL

## 2023-11-20 VITALS — DIASTOLIC BLOOD PRESSURE: 70 MMHG | BODY MASS INDEX: 23.91 KG/M2 | WEIGHT: 135 LBS | SYSTOLIC BLOOD PRESSURE: 118 MMHG

## 2023-11-20 DIAGNOSIS — E89.41 HOT FLASHES DUE TO SURGICAL MENOPAUSE: Primary | ICD-10-CM

## 2023-11-20 DIAGNOSIS — I10 ESSENTIAL (PRIMARY) HYPERTENSION: ICD-10-CM

## 2023-11-20 DIAGNOSIS — E34.9 HORMONE IMBALANCE: ICD-10-CM

## 2023-11-20 DIAGNOSIS — N89.8 VAGINAL DRYNESS: ICD-10-CM

## 2023-11-20 DIAGNOSIS — R37 SEXUAL DYSFUNCTION: ICD-10-CM

## 2023-11-20 DIAGNOSIS — N94.10 DYSPAREUNIA IN FEMALE: ICD-10-CM

## 2023-11-20 DIAGNOSIS — G47.00 INSOMNIA, UNSPECIFIED TYPE: ICD-10-CM

## 2023-11-20 PROCEDURE — 3078F DIAST BP <80 MM HG: CPT | Performed by: NURSE PRACTITIONER

## 2023-11-20 PROCEDURE — 99214 OFFICE O/P EST MOD 30 MIN: CPT | Performed by: NURSE PRACTITIONER

## 2023-11-20 PROCEDURE — G8427 DOCREV CUR MEDS BY ELIG CLIN: HCPCS | Performed by: NURSE PRACTITIONER

## 2023-11-20 PROCEDURE — 3074F SYST BP LT 130 MM HG: CPT | Performed by: NURSE PRACTITIONER

## 2023-11-20 PROCEDURE — 1036F TOBACCO NON-USER: CPT | Performed by: NURSE PRACTITIONER

## 2023-11-20 PROCEDURE — G8484 FLU IMMUNIZE NO ADMIN: HCPCS | Performed by: NURSE PRACTITIONER

## 2023-11-20 PROCEDURE — G8420 CALC BMI NORM PARAMETERS: HCPCS | Performed by: NURSE PRACTITIONER

## 2023-11-20 RX ORDER — ESTRADIOL 1 MG/1
1.5 TABLET ORAL DAILY
Qty: 45 TABLET | Refills: 5 | Status: SHIPPED | OUTPATIENT
Start: 2023-11-20

## 2023-11-20 NOTE — PROGRESS NOTES
Subjective:  Aleyda Noel is in for for follow up on HRT. Patient is hormone deficient due to hysterectomy with BSO, which occurred in October 2022. Hx of HTN controlled with medication has a cardiologist she follows with. She did complete testing for May Turners Syndrome given hx PCS and it was negative she states. She was originally started on transdermal HRT but stated kept falling off so requested to switch to oral Estrace. She is now on estrace 1.5mg daily she states which has helped to improve hot flashes she notes when has been of fit for few days do  snot feel good. She was also concerned abut vaginal dryness which was causing dyspareunia when seen here last in May 2023- she denied vaginal discharge, odor, or itching or bleeding. She was started on vaginal estrace also and she feels dryness has improved but notes decreased sensation with intercourse since having her hysterectomy. She denies chest pain, S.O.B., headaches, calf tenderness, DVT prevention reviewed. Risk of estrogen Breast cancer, and DVT reviewed patient aware of risk . Review of systems per HPI, otherwise negative. Allergies; medications; past medical, surgical, family, and social history; and problem list reviewed as indicated in this encounter. Objective:  Vitals:  Blood pressure 118/70, weight 61.2 kg (135 lb), last menstrual period 06/17/2022, not currently breastfeeding. Constitutional: She is oriented to person, place, and time. She appears well-developed and well-nourished and in no acute distress. Cardiovascular: Normal rate and regular rhythm, no murmur, rub, or gallop    Pulmonary/Chest: Effort normal and breath sounds normal. No rales or wheezes. No chest retraction. Abdomen: soft, non tender  Extremities: no clubbing, cyanosis, or edema  Neurological:  CN II - XII grossly intact; no focal neurological deficits  Psychiatric:  Well groomed, well dressed.   The patient maintains appropriate eye contact and

## 2024-02-28 ENCOUNTER — TELEPHONE (OUTPATIENT)
Dept: GASTROENTEROLOGY | Age: 39
End: 2024-02-28

## 2024-03-11 ENCOUNTER — TRANSCRIBE ORDERS (OUTPATIENT)
Dept: ADMINISTRATIVE | Age: 39
End: 2024-03-11

## 2024-03-11 DIAGNOSIS — I83.893 SYMPTOMATIC VARICOSE VEINS OF BOTH LOWER EXTREMITIES: ICD-10-CM

## 2024-03-11 DIAGNOSIS — N94.89 PELVIC CONGESTION SYNDROME: Primary | ICD-10-CM

## 2024-04-05 ENCOUNTER — TELEPHONE (OUTPATIENT)
Dept: PHARMACY | Facility: CLINIC | Age: 39
End: 2024-04-05

## 2024-04-06 ENCOUNTER — E-VISIT (OUTPATIENT)
Dept: PRIMARY CARE CLINIC | Age: 39
End: 2024-04-06

## 2024-04-06 DIAGNOSIS — J06.9 VIRAL UPPER RESPIRATORY TRACT INFECTION: Primary | ICD-10-CM

## 2024-04-06 RX ORDER — METHYLPREDNISOLONE 4 MG/1
TABLET ORAL
Qty: 1 KIT | Refills: 0 | Status: SHIPPED | OUTPATIENT
Start: 2024-04-06 | End: 2024-04-12

## 2024-04-06 ASSESSMENT — LIFESTYLE VARIABLES: SMOKING_STATUS: NO, I'VE NEVER SMOKED

## 2024-04-06 NOTE — PROGRESS NOTES
Val Travis (1985) initiated an asynchronous digital communication through [employer].    HPI: per patient questionnaire     Exam: not applicable    Diagnoses and all orders for this visit:  Diagnoses and all orders for this visit:    Viral upper respiratory tract infection    Other orders  -     methylPREDNISolone (MEDROL, AGUSTIN,) 4 MG tablet; Take as directed      Sx started 4 days ago. Recommend supportive care for 7-10 days. Agreeable to steroids. Medication sent. Supportive care suggestions provided. F/u with pcp as needed     Time: EV3 - 21 or more minutes were spent on the digital evaluation and management of this patient.22 min     Cass Ashton, APRN - CNP

## 2024-05-16 ENCOUNTER — HOSPITAL ENCOUNTER (OUTPATIENT)
Age: 39
Discharge: HOME | End: 2024-05-16
Payer: COMMERCIAL

## 2024-05-16 DIAGNOSIS — E34.9 HORMONE IMBALANCE: ICD-10-CM

## 2024-05-16 LAB
25(OH)D3 SERPL-MCNC: 37 NG/ML (ref 30–100)
CORTIS SERPL-MCNC: 7.9 UG/DL (ref 2.5–19.5)
DHEA-S SERPL-MCNC: 183 UG/DL (ref 60.9–337)
ESTRADIOL LEVEL: 18 PG/ML
PROGEST SERPL-MCNC: 0.3 NG/ML
SHBG SERPL-SCNC: 63 NMOL/L (ref 25–122)
TESTOST FREE MFR SERPL: 1 PG/ML (ref 1.3–9.2)
TESTOST SERPL-MCNC: 9 NG/DL (ref 8–48)
TESTOSTERONE, BIOAVAILABLE: 2.4 NG/DL (ref 4.1–25.5)
TSH SERPL DL<=0.05 MIU/L-ACNC: 0.69 UIU/ML (ref 0.27–4.2)

## 2024-05-16 PROCEDURE — 84144 ASSAY OF PROGESTERONE: CPT

## 2024-05-16 PROCEDURE — 84443 ASSAY THYROID STIM HORMONE: CPT

## 2024-05-16 PROCEDURE — 84403 ASSAY OF TOTAL TESTOSTERONE: CPT

## 2024-05-16 PROCEDURE — 36415 COLL VENOUS BLD VENIPUNCTURE: CPT

## 2024-05-16 PROCEDURE — 82670 ASSAY OF TOTAL ESTRADIOL: CPT

## 2024-05-16 PROCEDURE — 82627 DEHYDROEPIANDROSTERONE: CPT

## 2024-05-16 PROCEDURE — 82679 ASSAY OF ESTRONE: CPT

## 2024-05-16 PROCEDURE — 82306 VITAMIN D 25 HYDROXY: CPT

## 2024-05-16 PROCEDURE — 84270 ASSAY OF SEX HORMONE GLOBUL: CPT

## 2024-05-16 PROCEDURE — 82533 TOTAL CORTISOL: CPT

## 2024-05-20 ENCOUNTER — PATIENT MESSAGE (OUTPATIENT)
Dept: OBGYN CLINIC | Age: 39
End: 2024-05-20

## 2024-05-21 LAB — ESTRONE SERPL-MCNC: 31 PG/ML

## 2024-05-21 NOTE — TELEPHONE ENCOUNTER
From: Val Travis  To: Svetlana Jarrett  Sent: 5/20/2024 4:03 PM EDT  Subject: Hormone imbalance     Hello I saw my test results :/ . [facility] could explain a lot. Does the compound pharmacy assist with getting my body back on track or [facility]. I just need direction.

## 2024-07-03 DIAGNOSIS — E89.41 HOT FLASHES DUE TO SURGICAL MENOPAUSE: ICD-10-CM

## 2024-07-03 DIAGNOSIS — G47.00 INSOMNIA, UNSPECIFIED TYPE: ICD-10-CM

## 2024-07-05 RX ORDER — ESTRADIOL 1 MG/1
1.5 TABLET ORAL DAILY
Qty: 45 TABLET | Refills: 3 | Status: SHIPPED | OUTPATIENT
Start: 2024-07-05

## 2024-07-05 NOTE — TELEPHONE ENCOUNTER
Medication Request/Refill Telephone Documentation:  Medication Requested: estrace   Provider last filled by: colt  Last visit: 11/20/23  Last annual/pap smear: 1/5/22  NEXT APPT:n/a

## 2024-07-05 NOTE — TELEPHONE ENCOUNTER
Please call pt to schedule annual exam/med check she will be due anytime after 11/20/24 I refilled script for HRT  but recommend she be scheduled for annual so no lapse in prescription.

## 2024-08-20 ENCOUNTER — OFFICE VISIT (OUTPATIENT)
Age: 39
End: 2024-08-20
Payer: COMMERCIAL

## 2024-08-20 VITALS — DIASTOLIC BLOOD PRESSURE: 78 MMHG | SYSTOLIC BLOOD PRESSURE: 114 MMHG | OXYGEN SATURATION: 100 % | HEART RATE: 58 BPM

## 2024-08-20 DIAGNOSIS — R10.2 PELVIC PAIN: Primary | ICD-10-CM

## 2024-08-20 DIAGNOSIS — R33.9 RETENTION OF URINE: ICD-10-CM

## 2024-08-20 DIAGNOSIS — N94.89 FEMALE PELVIC CONGESTION SYNDROME: ICD-10-CM

## 2024-08-20 DIAGNOSIS — I86.3 VULVAR VARICOSE VEINS: ICD-10-CM

## 2024-08-20 LAB
BILIRUBIN, POC: NORMAL
BLOOD URINE, POC: NORMAL
CLARITY, POC: NORMAL
COLOR, POC: NORMAL
GLUCOSE URINE, POC: NORMAL
KETONES, POC: NORMAL
LEUKOCYTE EST, POC: 25
NITRITE, POC: NORMAL
PH, POC: 5
PROTEIN, POC: NORMAL
SPECIFIC GRAVITY, POC: 1.02
UROBILINOGEN, POC: NORMAL

## 2024-08-20 PROCEDURE — 99214 OFFICE O/P EST MOD 30 MIN: CPT | Performed by: OBSTETRICS & GYNECOLOGY

## 2024-08-20 PROCEDURE — G8427 DOCREV CUR MEDS BY ELIG CLIN: HCPCS | Performed by: OBSTETRICS & GYNECOLOGY

## 2024-08-20 PROCEDURE — 3074F SYST BP LT 130 MM HG: CPT | Performed by: OBSTETRICS & GYNECOLOGY

## 2024-08-20 PROCEDURE — 1036F TOBACCO NON-USER: CPT | Performed by: OBSTETRICS & GYNECOLOGY

## 2024-08-20 PROCEDURE — 81003 URINALYSIS AUTO W/O SCOPE: CPT | Performed by: OBSTETRICS & GYNECOLOGY

## 2024-08-20 PROCEDURE — G8420 CALC BMI NORM PARAMETERS: HCPCS | Performed by: OBSTETRICS & GYNECOLOGY

## 2024-08-20 PROCEDURE — 3078F DIAST BP <80 MM HG: CPT | Performed by: OBSTETRICS & GYNECOLOGY

## 2024-08-20 PROCEDURE — 51798 US URINE CAPACITY MEASURE: CPT | Performed by: OBSTETRICS & GYNECOLOGY

## 2024-08-20 RX ORDER — FLUOXETINE HYDROCHLORIDE 40 MG/1
80 CAPSULE ORAL DAILY
COMMUNITY

## 2024-08-20 NOTE — PATIENT INSTRUCTIONS
be helpful to use distracting activities such as watching televisions, reading, and conversation or non-physical hobbies as you resist the urge to urinate.    YOU WILL NEED:    A Bladder Log to document your schedule (BRING your completed Bladder Log  records to each appointment).   A pencil  A Timer to remind you when you are due to void next  Determination    Remember motivation and the gradual increase in the times between voidings are your keys to success.  Do not give up and do not let your bladder dictate your life style!      DIRECTIONS to record your Bladder Retraining Log:    Your voiding schedule will be to attempt to void every:    _____hour(s) and _____minutes for _____days  _____hour(s) and _____minutes for _____days  _____hour(s) and _____minutes for _____days  _____hour(s) and _____minutes for _____days  _____hour(s) and _____minutes for _____days  _____hour(s) and _____minutes for _____days        You will increase your time schedule by _____ minute intervals.  You will Continue to increase the time   until you reach your voiding schedule goal of every____ hour(s).    Each morning set your alarm clock as you desire, void and record the time in your bladder retraining log. Set the time for your next scheduled voiding.  Empty your bladder at the scheduled time whether you need to go or not.  Document the time and V (for void) on the bladder retraining log when you attempt to void.  If you feel the urge to urinate before the specified time, do your suppression techniques and try to distract yourself.  If you are still unable to wait until your next scheduled voiding time, you may void early.  If it is more than 10 minutes before your scheduled voiding time, document the time you voided and VE (for void early) on the bladder retraining log.  Then reset the timer.  For example, you are scheduled to void at 10 AM on an every 2-hour schedule.  At. 9:30 AM you voided early because you were unable to wait.

## 2024-08-20 NOTE — PROGRESS NOTES
and note construction, Extensive counseling, and Covering additional health topics on top of those listed in the chief complaint    Point of care: The supervising physician was present & available for assistance during the critical portions of the visit & procedure    Follow up: Return if symptoms worsen or fail to improve.     Electronically signed by Rohit Rolon DO on 8/20/2024 at 9:38 AM    EMR / Voice Dictation Disclaimer: - This note is created with the assistance of a speech recognition program.  While intending to generate a timely document that accurately reflects the content of the visit, there is no guarantee every grammatical, syntax, or spelling error has been or will be identified or corrected.  Additionally, system limitations of the EMR and voice recognition software beyond the control of the practitioner may cause unintentional errors or omissions not identified or corrected at the time of record finalization and signature.

## 2024-09-10 ENCOUNTER — OFFICE VISIT (OUTPATIENT)
Dept: ORTHOPEDIC SURGERY | Age: 39
End: 2024-09-10
Payer: COMMERCIAL

## 2024-09-10 VITALS — HEIGHT: 64 IN | RESPIRATION RATE: 14 BRPM | BODY MASS INDEX: 25.61 KG/M2 | WEIGHT: 150 LBS

## 2024-09-10 DIAGNOSIS — G89.29 CHRONIC MIDLINE LOW BACK PAIN WITHOUT SCIATICA: Primary | ICD-10-CM

## 2024-09-10 DIAGNOSIS — M54.50 CHRONIC MIDLINE LOW BACK PAIN WITHOUT SCIATICA: Primary | ICD-10-CM

## 2024-09-10 DIAGNOSIS — M54.2 NECK PAIN: ICD-10-CM

## 2024-09-10 PROCEDURE — 99203 OFFICE O/P NEW LOW 30 MIN: CPT | Performed by: ORTHOPAEDIC SURGERY

## 2024-09-10 PROCEDURE — G8419 CALC BMI OUT NRM PARAM NOF/U: HCPCS | Performed by: ORTHOPAEDIC SURGERY

## 2024-09-10 PROCEDURE — 1036F TOBACCO NON-USER: CPT | Performed by: ORTHOPAEDIC SURGERY

## 2024-09-10 PROCEDURE — G8428 CUR MEDS NOT DOCUMENT: HCPCS | Performed by: ORTHOPAEDIC SURGERY

## 2024-09-11 ENCOUNTER — PATIENT MESSAGE (OUTPATIENT)
Dept: OBGYN CLINIC | Age: 39
End: 2024-09-11

## 2024-09-11 ENCOUNTER — TELEPHONE (OUTPATIENT)
Age: 39
End: 2024-09-11

## 2024-09-11 DIAGNOSIS — E89.41 HOT FLASHES DUE TO SURGICAL MENOPAUSE: ICD-10-CM

## 2024-09-11 DIAGNOSIS — E34.9 HORMONE IMBALANCE: Primary | ICD-10-CM

## 2024-10-07 ENCOUNTER — APPOINTMENT (OUTPATIENT)
Dept: GENERAL RADIOLOGY | Age: 39
End: 2024-10-07
Payer: COMMERCIAL

## 2024-10-07 ENCOUNTER — HOSPITAL ENCOUNTER (EMERGENCY)
Age: 39
Discharge: HOME | End: 2024-10-07
Payer: COMMERCIAL

## 2024-10-07 ENCOUNTER — APPOINTMENT (OUTPATIENT)
Dept: CT IMAGING | Age: 39
End: 2024-10-07
Payer: COMMERCIAL

## 2024-10-07 VITALS
SYSTOLIC BLOOD PRESSURE: 108 MMHG | RESPIRATION RATE: 12 BRPM | TEMPERATURE: 98.7 F | WEIGHT: 135 LBS | HEART RATE: 65 BPM | BODY MASS INDEX: 23.05 KG/M2 | DIASTOLIC BLOOD PRESSURE: 71 MMHG | HEIGHT: 64 IN | OXYGEN SATURATION: 92 %

## 2024-10-07 DIAGNOSIS — R07.89 ATYPICAL CHEST PAIN: Primary | ICD-10-CM

## 2024-10-07 LAB
ANION GAP SERPL CALCULATED.3IONS-SCNC: 11 MMOL/L (ref 9–17)
BASOPHILS # BLD: <0.03 K/UL (ref 0–0.2)
BASOPHILS NFR BLD: 0 % (ref 0–2)
BUN SERPL-MCNC: 10 MG/DL (ref 6–20)
BUN/CREAT SERPL: 14 (ref 9–20)
CALCIUM SERPL-MCNC: 9.1 MG/DL (ref 8.6–10.4)
CHLORIDE SERPL-SCNC: 101 MMOL/L (ref 98–107)
CO2 SERPL-SCNC: 24 MMOL/L (ref 20–31)
CREAT SERPL-MCNC: 0.7 MG/DL (ref 0.5–0.9)
D DIMER PPP FEU-MCNC: 0.68 UG/ML FEU (ref 0–0.59)
EOSINOPHIL # BLD: 0.17 K/UL (ref 0–0.44)
EOSINOPHILS RELATIVE PERCENT: 4 % (ref 1–4)
ERYTHROCYTE [DISTWIDTH] IN BLOOD BY AUTOMATED COUNT: 12.1 % (ref 11.8–14.4)
GFR, ESTIMATED: >90 ML/MIN/1.73M2
GLUCOSE SERPL-MCNC: 133 MG/DL (ref 70–99)
HCT VFR BLD AUTO: 33.5 % (ref 36.3–47.1)
HGB BLD-MCNC: 11.3 G/DL (ref 11.9–15.1)
IMM GRANULOCYTES # BLD AUTO: 0.01 K/UL (ref 0–0.3)
IMM GRANULOCYTES NFR BLD: 0 %
LYMPHOCYTES NFR BLD: 1.58 K/UL (ref 1.1–3.7)
LYMPHOCYTES RELATIVE PERCENT: 34 % (ref 24–43)
MAGNESIUM SERPL-MCNC: 1.9 MG/DL (ref 1.6–2.6)
MCH RBC QN AUTO: 30.4 PG (ref 25.2–33.5)
MCHC RBC AUTO-ENTMCNC: 33.7 G/DL (ref 28.4–34.8)
MCV RBC AUTO: 90.1 FL (ref 82.6–102.9)
MONOCYTES NFR BLD: 0.36 K/UL (ref 0.1–1.2)
MONOCYTES NFR BLD: 8 % (ref 3–12)
NEUTROPHILS NFR BLD: 54 % (ref 36–65)
NEUTS SEG NFR BLD: 2.54 K/UL (ref 1.5–8.1)
NRBC BLD-RTO: 0 PER 100 WBC
PLATELET # BLD AUTO: 184 K/UL (ref 138–453)
PMV BLD AUTO: 10.1 FL (ref 8.1–13.5)
POTASSIUM SERPL-SCNC: 3.7 MMOL/L (ref 3.7–5.3)
RBC # BLD AUTO: 3.72 M/UL (ref 3.95–5.11)
SODIUM SERPL-SCNC: 136 MMOL/L (ref 135–144)
TROPONIN I SERPL HS-MCNC: <6 NG/L (ref 0–14)
TROPONIN I SERPL HS-MCNC: <6 NG/L (ref 0–14)
WBC OTHER # BLD: 4.7 K/UL (ref 3.5–11.3)

## 2024-10-07 PROCEDURE — 96374 THER/PROPH/DIAG INJ IV PUSH: CPT

## 2024-10-07 PROCEDURE — 71045 X-RAY EXAM CHEST 1 VIEW: CPT

## 2024-10-07 PROCEDURE — 85379 FIBRIN DEGRADATION QUANT: CPT

## 2024-10-07 PROCEDURE — 71260 CT THORAX DX C+: CPT

## 2024-10-07 PROCEDURE — 93005 ELECTROCARDIOGRAM TRACING: CPT

## 2024-10-07 PROCEDURE — 6360000002 HC RX W HCPCS

## 2024-10-07 PROCEDURE — 2580000003 HC RX 258

## 2024-10-07 PROCEDURE — 36415 COLL VENOUS BLD VENIPUNCTURE: CPT

## 2024-10-07 PROCEDURE — 84484 ASSAY OF TROPONIN QUANT: CPT

## 2024-10-07 PROCEDURE — 83735 ASSAY OF MAGNESIUM: CPT

## 2024-10-07 PROCEDURE — 85025 COMPLETE CBC W/AUTO DIFF WBC: CPT

## 2024-10-07 PROCEDURE — 99285 EMERGENCY DEPT VISIT HI MDM: CPT

## 2024-10-07 PROCEDURE — 80048 BASIC METABOLIC PNL TOTAL CA: CPT

## 2024-10-07 PROCEDURE — 6360000004 HC RX CONTRAST MEDICATION

## 2024-10-07 RX ORDER — KETOROLAC TROMETHAMINE 15 MG/ML
15 INJECTION, SOLUTION INTRAMUSCULAR; INTRAVENOUS ONCE
Status: COMPLETED | OUTPATIENT
Start: 2024-10-07 | End: 2024-10-07

## 2024-10-07 RX ORDER — 0.9 % SODIUM CHLORIDE 0.9 %
80 INTRAVENOUS SOLUTION INTRAVENOUS ONCE
Status: COMPLETED | OUTPATIENT
Start: 2024-10-07 | End: 2024-10-07

## 2024-10-07 RX ORDER — SODIUM CHLORIDE 0.9 % (FLUSH) 0.9 %
10 SYRINGE (ML) INJECTION PRN
Status: DISCONTINUED | OUTPATIENT
Start: 2024-10-07 | End: 2024-10-08 | Stop reason: HOSPADM

## 2024-10-07 RX ORDER — IOPAMIDOL 755 MG/ML
75 INJECTION, SOLUTION INTRAVASCULAR
Status: COMPLETED | OUTPATIENT
Start: 2024-10-07 | End: 2024-10-07

## 2024-10-07 RX ADMIN — SODIUM CHLORIDE, PRESERVATIVE FREE 10 ML: 5 INJECTION INTRAVENOUS at 21:26

## 2024-10-07 RX ADMIN — SODIUM CHLORIDE 80 ML: 9 INJECTION, SOLUTION INTRAVENOUS at 21:26

## 2024-10-07 RX ADMIN — IOPAMIDOL 75 ML: 755 INJECTION, SOLUTION INTRAVENOUS at 21:25

## 2024-10-07 RX ADMIN — KETOROLAC TROMETHAMINE 15 MG: 15 INJECTION, SOLUTION INTRAMUSCULAR; INTRAVENOUS at 20:42

## 2024-10-07 ASSESSMENT — ENCOUNTER SYMPTOMS
ABDOMINAL PAIN: 0
VOMITING: 0
NAUSEA: 0
SHORTNESS OF BREATH: 1

## 2024-10-07 ASSESSMENT — PAIN DESCRIPTION - LOCATION: LOCATION: CHEST

## 2024-10-07 ASSESSMENT — LIFESTYLE VARIABLES
HOW MANY STANDARD DRINKS CONTAINING ALCOHOL DO YOU HAVE ON A TYPICAL DAY: PATIENT DOES NOT DRINK
HOW OFTEN DO YOU HAVE A DRINK CONTAINING ALCOHOL: NEVER

## 2024-10-07 ASSESSMENT — PAIN DESCRIPTION - DESCRIPTORS: DESCRIPTORS: CRAMPING

## 2024-10-07 ASSESSMENT — HEART SCORE: ECG: NORMAL

## 2024-10-07 ASSESSMENT — PAIN SCALES - GENERAL
PAINLEVEL_OUTOF10: 8
PAINLEVEL_OUTOF10: 8
PAINLEVEL_OUTOF10: 7

## 2024-10-07 ASSESSMENT — PAIN - FUNCTIONAL ASSESSMENT: PAIN_FUNCTIONAL_ASSESSMENT: 0-10

## 2024-10-08 ENCOUNTER — CARE COORDINATION (OUTPATIENT)
Dept: OTHER | Facility: CLINIC | Age: 39
End: 2024-10-08

## 2024-10-08 LAB
EKG ATRIAL RATE: 86 BPM
EKG P AXIS: 99 DEGREES
EKG P-R INTERVAL: 116 MS
EKG Q-T INTERVAL: 356 MS
EKG QRS DURATION: 64 MS
EKG QTC CALCULATION (BAZETT): 426 MS
EKG R AXIS: 36 DEGREES
EKG T AXIS: 43 DEGREES
EKG VENTRICULAR RATE: 86 BPM

## 2024-10-08 PROCEDURE — 93010 ELECTROCARDIOGRAM REPORT: CPT | Performed by: INTERNAL MEDICINE

## 2024-10-08 NOTE — CARE COORDINATION
After review of record, patient no longer carries [employer] ACM will sign off     Aniyah HINDS, RN- [facility]  Associate Care Manager  [phone]  [email or web address]

## 2024-10-08 NOTE — ED PROVIDER NOTES
[facility] ED  Emergency Department Encounter  Emergency Medicine Attending     Pt Name:Val Travis  MRN: [id number]  Birthdate 1985  Date of evaluation: 10/7/24  PCP:  Warren Colvin APRN - NP  Note Started: 8:36 PM EDT      CHIEF COMPLAINT       Chief Complaint   Patient presents with    Chest Pain     Chest pain x3 days that has gotten progressively worse. Hx of pulm HTN       HISTORY OF PRESENT ILLNESS  (Location/Symptom, Timing/Onset, Context/Setting, Quality, Duration, Modifying Factors, Severity.)      39-year-old female presents for evaluation of chest pain.  Symptoms have been intermittent for the last 3 days over her last chest.  She states that it was a dull pain but today it worsened and it radiates down her left chest wall.  It became more intense while they were sitting at the dinner table today.  Denies any history of CAD or MI.  Denies any cigarette smoking.  No hemoptysis or leg pain or leg swelling.  No recent travel.            PAST MEDICAL / SURGICAL / SOCIAL / FAMILY HISTORY      has a past medical history of Anxiety, Asthma, Bladder prolapse, female, acquired, Body piercing, COVID-19, Essential (primary) hypertension, GERD (gastroesophageal reflux disease), History of arterial disease of lower extremity, HSV (herpes simplex virus) anogenital infection, Migraines, Mitral valve prolapse, Nonrheumatic mitral (valve) insufficiency, Nonrheumatic tricuspid valve regurgitation, BRE (obstructive sleep apnea), Ovary anomaly, Palpitations, Pelvic congestive syndrome, PONV (postoperative nausea and vomiting), POTS (postural orthostatic tachycardia syndrome), Systemic sclerosis (HCC), and Venous insufficiency.     has a past surgical history that includes Breast biopsy; [place] tooth extraction; Nasal sinus surgery (Bilateral, 2011); Breast enhancement surgery (Bilateral, 2008); Breast Implant Removal (Bilateral, 2011); Tubal ligation; Colposcopy (06/20/2016); Upper gastrointestinal endoscopy

## 2024-11-11 DIAGNOSIS — G47.00 INSOMNIA, UNSPECIFIED TYPE: ICD-10-CM

## 2024-11-11 DIAGNOSIS — E89.41 HOT FLASHES DUE TO SURGICAL MENOPAUSE: ICD-10-CM

## 2024-11-11 RX ORDER — ESTRADIOL 1 MG/1
TABLET ORAL
Qty: 45 TABLET | Refills: 3 | OUTPATIENT
Start: 2024-11-11

## 2024-12-12 DIAGNOSIS — E89.41 HOT FLASHES DUE TO SURGICAL MENOPAUSE: ICD-10-CM

## 2024-12-12 DIAGNOSIS — G47.00 INSOMNIA, UNSPECIFIED TYPE: ICD-10-CM

## 2024-12-16 DIAGNOSIS — E89.40 SURGICAL MENOPAUSE: ICD-10-CM

## 2024-12-16 DIAGNOSIS — N89.8 VAGINAL DRYNESS: ICD-10-CM

## 2024-12-16 DIAGNOSIS — G47.00 INSOMNIA, UNSPECIFIED TYPE: ICD-10-CM

## 2024-12-16 DIAGNOSIS — E89.41 HOT FLASHES DUE TO SURGICAL MENOPAUSE: ICD-10-CM

## 2024-12-16 RX ORDER — ESTRADIOL 1 MG/1
1.5 TABLET ORAL DAILY
Qty: 45 TABLET | Refills: 3 | OUTPATIENT
Start: 2024-12-16

## 2024-12-16 RX ORDER — VALACYCLOVIR HYDROCHLORIDE 1 G/1
1000 TABLET, FILM COATED ORAL DAILY
Qty: 30 TABLET | Refills: 5 | OUTPATIENT
Start: 2024-12-16

## 2024-12-16 RX ORDER — ESTRADIOL 0.1 MG/G
CREAM VAGINAL
Qty: 42.5 G | Refills: 3 | OUTPATIENT
Start: 2024-12-16

## 2024-12-16 NOTE — TELEPHONE ENCOUNTER
Pt would need an appointment before providing any refills of hormones over 1 year since last appointment

## 2025-04-01 ENCOUNTER — TELEPHONE (OUTPATIENT)
Dept: OBGYN CLINIC | Age: 40
End: 2025-04-01

## 2025-04-01 DIAGNOSIS — E89.40 SURGICAL MENOPAUSE: Primary | ICD-10-CM

## 2025-04-01 DIAGNOSIS — E34.9 HORMONE IMBALANCE: ICD-10-CM

## 2025-04-01 NOTE — TELEPHONE ENCOUNTER
Patient called for Endocrinology referral. Wanted [facility]. Pt would like referral due to hormone levels. \"Wants someone to hear her out because a compound pharmacy is too expensive.\"

## 2025-06-04 ENCOUNTER — E-VISIT (OUTPATIENT)
Dept: PRIMARY CARE CLINIC | Age: 40
End: 2025-06-04
Payer: COMMERCIAL

## 2025-06-04 DIAGNOSIS — L65.9 HAIR LOSS: ICD-10-CM

## 2025-06-04 DIAGNOSIS — B36.9 FUNGAL DERMATITIS: Primary | ICD-10-CM

## 2025-06-04 PROCEDURE — 99423 OL DIG E/M SVC 21+ MIN: CPT | Performed by: NURSE PRACTITIONER

## 2025-06-04 RX ORDER — CLOBETASOL PROPIONATE 0.05 G/100ML
1 SHAMPOO TOPICAL DAILY
Qty: 118 ML | Refills: 0 | Status: SHIPPED | OUTPATIENT
Start: 2025-06-04

## 2025-06-04 NOTE — PROGRESS NOTES
Reviewed questionnaire    Reviewed meds/allergies    Dx Fungal dermatitis, hair loss    Plan Renewed clobetasol shampoo and placed referral to derm, follow up with PCP as needed.    Time spent on visit 23min

## (undated) DEVICE — GLOVE ORTHO 7 1/2   MSG9475

## (undated) DEVICE — BLADELESS OBTURATOR: Brand: WECK VISTA

## (undated) DEVICE — SUTURE MCRYL + SZ 4-0 L27IN ABSRB UD L19MM PS-2 3/8 CIR MCP426H

## (undated) DEVICE — GOWN,SIRUS,NONRNF,SETINSLV,XL,20/CS: Brand: MEDLINE

## (undated) DEVICE — BLANKET WRM W29.9XL79.1IN UP BODY FORC AIR MISTRAL-AIR

## (undated) DEVICE — SPONGE GZ W2XL2IN NONWOVEN 4 PLY FASTER WICKING ABIL AVANT

## (undated) DEVICE — TROCARS: Brand: KII® BALLOON BLUNT TIP SYSTEM

## (undated) DEVICE — ARM DRAPE

## (undated) DEVICE — MASTISOL ADHESIVE LIQ 2/3ML

## (undated) DEVICE — ENDO KIT W/SYRINGE: Brand: MEDLINE INDUSTRIES, INC.

## (undated) DEVICE — COVER,MAYO STAND,XL,STERILE: Brand: MEDLINE

## (undated) DEVICE — ST CHARLES GEN LAPAROSCOPY PK: Brand: MEDLINE INDUSTRIES, INC.

## (undated) DEVICE — MERCY HEALTH ST CHARLES: Brand: MEDLINE INDUSTRIES, INC.

## (undated) DEVICE — FORCEPS BX L240CM WRK CHN 2.8MM STD CAP W/ NDL MIC MESH

## (undated) DEVICE — STRIP,CLOSURE,WOUND,MEDI-STRIP,1/2X4: Brand: MEDLINE

## (undated) DEVICE — SOLUTION ANTIFOG VIS SYS CLEARIFY LAPSCP

## (undated) DEVICE — TROCAR: Brand: KII FIOS FIRST ENTRY

## (undated) DEVICE — CANNULA SEAL

## (undated) DEVICE — BITEBLOCK 54FR W/ DENT RIM BLOX

## (undated) DEVICE — COVER,TABLE,60X90,STERILE: Brand: MEDLINE

## (undated) DEVICE — DEFENDO AIR WATER SUCTION AND BIOPSY VALVE KIT FOR  OLYMPUS: Brand: DEFENDO AIR/WATER/SUCTION AND BIOPSY VALVE

## (undated) DEVICE — DRESSING TRNSPAR W2XL2.75IN FLM SHT SEMIPERMEABLE WIND

## (undated) DEVICE — GLOVE ORANGE PI 7   MSG9070

## (undated) DEVICE — SUTURE PDS II SZ 0 L27IN ABSRB VLT UR-6 L26MM 1/2 CIR D7185

## (undated) DEVICE — SYRINGE MED 20ML STD CLR PLAS LUERSLIP TIP N CTRL DISP

## (undated) DEVICE — CLIP INT M L POLYMER LOK LIG HEM O LOK

## (undated) DEVICE — GOWN,AURORA,NONREINFORCED,LARGE: Brand: MEDLINE